# Patient Record
Sex: FEMALE | Race: WHITE | NOT HISPANIC OR LATINO
[De-identification: names, ages, dates, MRNs, and addresses within clinical notes are randomized per-mention and may not be internally consistent; named-entity substitution may affect disease eponyms.]

---

## 2020-01-07 ENCOUNTER — APPOINTMENT (OUTPATIENT)
Dept: OBGYN | Facility: CLINIC | Age: 25
End: 2020-01-07

## 2020-01-13 ENCOUNTER — RESULT REVIEW (OUTPATIENT)
Age: 25
End: 2020-01-13

## 2020-01-14 ENCOUNTER — OUTPATIENT (OUTPATIENT)
Dept: OUTPATIENT SERVICES | Facility: HOSPITAL | Age: 25
LOS: 1 days | End: 2020-01-14
Payer: MEDICAID

## 2020-01-14 ENCOUNTER — APPOINTMENT (OUTPATIENT)
Dept: OBGYN | Facility: CLINIC | Age: 25
End: 2020-01-14
Payer: MEDICAID

## 2020-01-14 VITALS
HEIGHT: 62 IN | SYSTOLIC BLOOD PRESSURE: 88 MMHG | BODY MASS INDEX: 29.65 KG/M2 | WEIGHT: 161.13 LBS | DIASTOLIC BLOOD PRESSURE: 51 MMHG | HEART RATE: 105 BPM | OXYGEN SATURATION: 98 % | TEMPERATURE: 98.3 F

## 2020-01-14 DIAGNOSIS — Z98.89 OTHER SPECIFIED POSTPROCEDURAL STATES: Chronic | ICD-10-CM

## 2020-01-14 DIAGNOSIS — Z00.00 ENCOUNTER FOR GENERAL ADULT MEDICAL EXAMINATION WITHOUT ABNORMAL FINDINGS: ICD-10-CM

## 2020-01-14 PROCEDURE — 99395 PREV VISIT EST AGE 18-39: CPT | Mod: NC,25

## 2020-01-14 PROCEDURE — 36415 COLL VENOUS BLD VENIPUNCTURE: CPT

## 2020-01-14 PROCEDURE — G0463: CPT

## 2020-01-14 PROCEDURE — 36415 COLL VENOUS BLD VENIPUNCTURE: CPT | Mod: NC

## 2020-01-14 NOTE — HISTORY OF PRESENT ILLNESS
[Definite:  ___ (Date)] : the last menstrual period was [unfilled] [Normal Amount/Duration] : was of a normal amount and duration [Menstrual Cramps] : menstrual cramps [Male ___] : [unfilled] male [Sexually Active] : is sexually active [Regular Cycle Intervals] : periods have been irregular

## 2020-01-15 DIAGNOSIS — Z01.419 ENCOUNTER FOR GYNECOLOGICAL EXAMINATION (GENERAL) (ROUTINE) WITHOUT ABNORMAL FINDINGS: ICD-10-CM

## 2020-01-15 LAB
C TRACH RRNA SPEC QL NAA+PROBE: SIGNIFICANT CHANGE UP
C TRACH+GC RRNA SPEC QL PROBE: SIGNIFICANT CHANGE UP
N GONORRHOEA RRNA SPEC QL NAA+PROBE: SIGNIFICANT CHANGE UP

## 2020-01-18 LAB — CYTOLOGY SPEC DOC CYTO: SIGNIFICANT CHANGE UP

## 2020-03-24 ENCOUNTER — APPOINTMENT (OUTPATIENT)
Dept: OBGYN | Facility: CLINIC | Age: 25
End: 2020-03-24
Payer: MEDICAID

## 2020-03-24 ENCOUNTER — OUTPATIENT (OUTPATIENT)
Dept: OUTPATIENT SERVICES | Facility: HOSPITAL | Age: 25
LOS: 1 days | End: 2020-03-24
Payer: MEDICAID

## 2020-03-24 VITALS
BODY MASS INDEX: 28.89 KG/M2 | TEMPERATURE: 97.7 F | HEIGHT: 62 IN | WEIGHT: 157 LBS | HEART RATE: 87 BPM | DIASTOLIC BLOOD PRESSURE: 75 MMHG | SYSTOLIC BLOOD PRESSURE: 111 MMHG | OXYGEN SATURATION: 99 %

## 2020-03-24 DIAGNOSIS — Z00.00 ENCOUNTER FOR GENERAL ADULT MEDICAL EXAMINATION WITHOUT ABNORMAL FINDINGS: ICD-10-CM

## 2020-03-24 DIAGNOSIS — Z98.89 OTHER SPECIFIED POSTPROCEDURAL STATES: Chronic | ICD-10-CM

## 2020-03-24 LAB
APPEARANCE UR: ABNORMAL
BACTERIA # UR AUTO: ABNORMAL
BILIRUB UR-MCNC: NEGATIVE — SIGNIFICANT CHANGE UP
COLOR SPEC: YELLOW — SIGNIFICANT CHANGE UP
DIFF PNL FLD: ABNORMAL
EPI CELLS # UR: 10 /HPF — HIGH (ref 0–5)
GLUCOSE UR QL: NEGATIVE — SIGNIFICANT CHANGE UP
HYALINE CASTS # UR AUTO: 0 /LPF — SIGNIFICANT CHANGE UP (ref 0–7)
KETONES UR-MCNC: NEGATIVE — SIGNIFICANT CHANGE UP
LEUKOCYTE ESTERASE UR-ACNC: ABNORMAL
NITRITE UR-MCNC: NEGATIVE — SIGNIFICANT CHANGE UP
PH UR: 6.5 — SIGNIFICANT CHANGE UP (ref 5–8)
PROT UR-MCNC: ABNORMAL
RBC CASTS # UR COMP ASSIST: 229 /HPF — HIGH (ref 0–4)
SP GR SPEC: 1.03 — HIGH (ref 1.01–1.02)
UROBILINOGEN FLD QL: SIGNIFICANT CHANGE UP
WBC UR QL: >720 /HPF — HIGH (ref 0–5)

## 2020-03-24 PROCEDURE — G0463: CPT

## 2020-03-24 PROCEDURE — 81025 URINE PREGNANCY TEST: CPT

## 2020-03-24 PROCEDURE — 87186 SC STD MICRODIL/AGAR DIL: CPT

## 2020-03-24 PROCEDURE — 87086 URINE CULTURE/COLONY COUNT: CPT

## 2020-03-24 PROCEDURE — 96372 THER/PROPH/DIAG INJ SC/IM: CPT

## 2020-03-24 PROCEDURE — 99213 OFFICE O/P EST LOW 20 MIN: CPT | Mod: NC,25

## 2020-03-24 PROCEDURE — 81001 URINALYSIS AUTO W/SCOPE: CPT

## 2020-03-24 NOTE — HISTORY OF PRESENT ILLNESS
[Last Pap ___] : Last cervical pap smear was [unfilled] [Definite:  ___ (Date)] : the last menstrual period was [unfilled] [Normal Amount/Duration] : was of a normal amount and duration [Menarche Age: ____] : age at menarche was [unfilled] [Sexually Active] : is sexually active [Male ___] : [unfilled] male [Spotting Between  Menses] : no spotting between menses [Regular Cycle Intervals] : periods have been irregular [Menstrual Cramps] : no menstrual cramps

## 2020-03-24 NOTE — REVIEW OF SYSTEMS
[Dysuria] : dysuria [Incontinence] : no incontinence [Frequency] : frequency [Urgency] : urgency [Urethral Discharge] : no abnormal urethral discharge [Nl] : Integumentary

## 2020-03-24 NOTE — CHIEF COMPLAINT
[Follow Up] : follow up GYN visit [FreeTextEntry1] : Patient complains of sensitivity in the vaginal area and frequency of urination.\par

## 2020-03-25 DIAGNOSIS — Z30.42 ENCOUNTER FOR SURVEILLANCE OF INJECTABLE CONTRACEPTIVE: ICD-10-CM

## 2020-03-25 DIAGNOSIS — R39.9 UNSPECIFIED SYMPTOMS AND SIGNS INVOLVING THE GENITOURINARY SYSTEM: ICD-10-CM

## 2020-06-15 ENCOUNTER — OUTPATIENT (OUTPATIENT)
Dept: OUTPATIENT SERVICES | Facility: HOSPITAL | Age: 25
LOS: 1 days | End: 2020-06-15
Payer: MEDICAID

## 2020-06-15 ENCOUNTER — APPOINTMENT (OUTPATIENT)
Dept: OBGYN | Facility: CLINIC | Age: 25
End: 2020-06-15
Payer: MEDICAID

## 2020-06-15 VITALS
RESPIRATION RATE: 18 BRPM | WEIGHT: 166 LBS | BODY MASS INDEX: 30.55 KG/M2 | DIASTOLIC BLOOD PRESSURE: 67 MMHG | HEIGHT: 62 IN | TEMPERATURE: 98.2 F | SYSTOLIC BLOOD PRESSURE: 101 MMHG | OXYGEN SATURATION: 100 %

## 2020-06-15 DIAGNOSIS — Z98.89 OTHER SPECIFIED POSTPROCEDURAL STATES: Chronic | ICD-10-CM

## 2020-06-15 DIAGNOSIS — Z00.00 ENCOUNTER FOR GENERAL ADULT MEDICAL EXAMINATION WITHOUT ABNORMAL FINDINGS: ICD-10-CM

## 2020-06-15 PROCEDURE — ZZZZZ: CPT

## 2020-06-15 PROCEDURE — 81025 URINE PREGNANCY TEST: CPT

## 2020-06-15 PROCEDURE — 96372 THER/PROPH/DIAG INJ SC/IM: CPT

## 2020-06-15 PROCEDURE — G0463: CPT

## 2020-06-16 DIAGNOSIS — Z30.42 ENCOUNTER FOR SURVEILLANCE OF INJECTABLE CONTRACEPTIVE: ICD-10-CM

## 2020-09-01 ENCOUNTER — OUTPATIENT (OUTPATIENT)
Dept: OUTPATIENT SERVICES | Facility: HOSPITAL | Age: 25
LOS: 1 days | End: 2020-09-01
Payer: MEDICAID

## 2020-09-01 ENCOUNTER — APPOINTMENT (OUTPATIENT)
Dept: OBGYN | Facility: CLINIC | Age: 25
End: 2020-09-01
Payer: MEDICAID

## 2020-09-01 DIAGNOSIS — Z00.00 ENCOUNTER FOR GENERAL ADULT MEDICAL EXAMINATION WITHOUT ABNORMAL FINDINGS: ICD-10-CM

## 2020-09-01 DIAGNOSIS — Z98.89 OTHER SPECIFIED POSTPROCEDURAL STATES: Chronic | ICD-10-CM

## 2020-09-01 PROCEDURE — 81025 URINE PREGNANCY TEST: CPT

## 2020-09-01 PROCEDURE — ZZZZZ: CPT

## 2020-09-01 PROCEDURE — G0463: CPT

## 2020-09-01 PROCEDURE — 96372 THER/PROPH/DIAG INJ SC/IM: CPT

## 2020-09-02 DIAGNOSIS — Z30.42 ENCOUNTER FOR SURVEILLANCE OF INJECTABLE CONTRACEPTIVE: ICD-10-CM

## 2020-11-24 ENCOUNTER — APPOINTMENT (OUTPATIENT)
Dept: OBGYN | Facility: CLINIC | Age: 25
End: 2020-11-24
Payer: MEDICAID

## 2020-11-24 ENCOUNTER — OUTPATIENT (OUTPATIENT)
Dept: OUTPATIENT SERVICES | Facility: HOSPITAL | Age: 25
LOS: 1 days | End: 2020-11-24
Payer: MEDICAID

## 2020-11-24 VITALS
OXYGEN SATURATION: 99 % | RESPIRATION RATE: 16 BRPM | TEMPERATURE: 98.4 F | WEIGHT: 166 LBS | SYSTOLIC BLOOD PRESSURE: 90 MMHG | HEIGHT: 62 IN | BODY MASS INDEX: 30.55 KG/M2 | HEART RATE: 71 BPM | DIASTOLIC BLOOD PRESSURE: 64 MMHG

## 2020-11-24 DIAGNOSIS — Z98.89 OTHER SPECIFIED POSTPROCEDURAL STATES: Chronic | ICD-10-CM

## 2020-11-24 DIAGNOSIS — Z00.00 ENCOUNTER FOR GENERAL ADULT MEDICAL EXAMINATION WITHOUT ABNORMAL FINDINGS: ICD-10-CM

## 2020-11-24 PROCEDURE — G0463: CPT

## 2020-11-24 PROCEDURE — 81025 URINE PREGNANCY TEST: CPT

## 2020-11-24 PROCEDURE — 96372 THER/PROPH/DIAG INJ SC/IM: CPT

## 2020-11-24 PROCEDURE — ZZZZZ: CPT

## 2020-12-01 DIAGNOSIS — Z30.42 ENCOUNTER FOR SURVEILLANCE OF INJECTABLE CONTRACEPTIVE: ICD-10-CM

## 2020-12-11 ENCOUNTER — EMERGENCY (EMERGENCY)
Facility: HOSPITAL | Age: 25
LOS: 1 days | Discharge: HOME | End: 2020-12-11
Attending: EMERGENCY MEDICINE
Payer: MEDICAID

## 2020-12-11 VITALS
RESPIRATION RATE: 20 BRPM | HEIGHT: 62 IN | HEART RATE: 102 BPM | DIASTOLIC BLOOD PRESSURE: 63 MMHG | OXYGEN SATURATION: 99 % | SYSTOLIC BLOOD PRESSURE: 97 MMHG | TEMPERATURE: 98 F

## 2020-12-11 VITALS
HEART RATE: 98 BPM | RESPIRATION RATE: 18 BRPM | TEMPERATURE: 98 F | SYSTOLIC BLOOD PRESSURE: 98 MMHG | OXYGEN SATURATION: 97 % | DIASTOLIC BLOOD PRESSURE: 67 MMHG

## 2020-12-11 DIAGNOSIS — Z98.89 OTHER SPECIFIED POSTPROCEDURAL STATES: Chronic | ICD-10-CM

## 2020-12-11 PROCEDURE — 99283 EMERGENCY DEPT VISIT LOW MDM: CPT

## 2020-12-11 NOTE — ED PROVIDER NOTE - ATTENDING CONTRIBUTION TO CARE
Agree with medical student H&P.  25y Female with history of penicillin allergy here for swelling of the face and hives. Pt had a candy bar this morning with almonds and noticed the swelling and itchiness soon after.  Pt went to an Urgent care given prednisone and pepcid.  In ED, pt denies lip or tongue swelling.  pt tolerating oral secretions, no change in voice, normal oxygen saturation. Pt reporting feeling better.  Will continue to observe, will hold Epi for now.

## 2020-12-11 NOTE — ED PROVIDER NOTE - OBJECTIVE STATEMENT
25y F with penicillin allergy here for swelling of the face and hives on arms. Pt had a kind bar this morning with almonds at 0700 and had facial swelling, redness and itchiness at 0720. She took benadryl 50mg at 720am and then went to urgent care. Urgent care gave prednisone 60mg and famotidine 20mg and sent her to ED. Pt is speaking in full sentences. Denies SOB. Denies tongue and throat swelling. Denies nausea/vomiting or diarrhea.

## 2020-12-11 NOTE — ED ADULT TRIAGE NOTE - CHIEF COMPLAINT QUOTE
c/o allergic reaction. Reports eating a kind bar at 0700 and had facial swelling and redness at 0720. Took benadryl 50mg at 7am. Send from urgent care for eval where pt received prednisone 60mg and famotidine 20mg. Speaking in full sentences

## 2020-12-11 NOTE — ED PROVIDER NOTE - CLINICAL SUMMARY MEDICAL DECISION MAKING FREE TEXT BOX
25y F here for allergic rxn. Pt appears to have had an allegic rxn to the kind bar. She is improving on benadryl, famotidine and prednisone. Will continue to monitor.

## 2020-12-11 NOTE — ED PROVIDER NOTE - PATIENT PORTAL LINK FT
You can access the FollowMyHealth Patient Portal offered by [facility] by registering at the following website: [email or web address]. By joining [employer]’s FollowMyHealth portal, you will also be able to view your health information using other applications (apps) compatible with our system.

## 2020-12-11 NOTE — ED PROVIDER NOTE - NSFOLLOWUPINSTRUCTIONS_ED_ALL_ED_FT
Allergies    WHAT YOU NEED TO KNOW:    What are allergies? Allergies are an immune system reaction to a substance called an allergen. Your immune system sees the allergen as harmful and attacks it.     What causes allergies? You may have allergies at certain times of the year or all year. The following are common allergies:   •Seasonal airborne allergies happen during certain times of the year. This is also called hay fever. Tree, weed, or grass pollen are examples of allergens that you breathe in.      •Environmental airborne allergy triggers you may breathe in year-round include dust, mold, and pet hair.       •Contact allergies include latex, found in items such as condoms and medical gloves. Latex allergies can be very serious.       •Insect sting allergies may be caused by bees, hornets, fire ants, or other insects that sting or bite you. Insect allergies can be very serious.       •Food allergies commonly include shellfish, wheat, and eggs. Some foods must be eaten to produce an allergic reaction. Other foods can trigger a reaction if they touch your skin or are breathed in.      What increases my risk for allergies? Allergic reactions can happen at any time, even if you have not had allergies before. You may develop an allergy after you have been exposed to an allergen more than once. Allergies are most common in children and elderly people, but anyone can have an allergic reaction. Your risk is also increased if you have a family history of allergies or a medical condition such as asthma.    What are the signs and symptoms of allergies?   •Mild symptoms include sneezing and a runny, itchy, or stuffy nose. You may also have swollen, watery, or itchy eyes, or skin itching. You may have swelling or pain where an insect bit or stung you.      •Anaphylaxis symptoms include trouble breathing or swallowing, a rash or hives, or severe swelling. You may also have a cough, wheezing, or feel lightheaded or dizzy. Anaphylaxis is a sudden, life-threatening reaction that needs immediate treatment.      How are allergies diagnosed? Your healthcare provider will ask about your signs and symptoms. He or she will ask what allergens you have been exposed to and if you have ever had other allergic reactions. He or she may look in your nose, ears, or throat. You may need additional testing if you developed anaphylaxis after you were exposed to a trigger and then exercised. This is called exercise-induced anaphylaxis. You may also need the following tests:   •Blood tests are used to check for signs of a reaction to allergens.       •Nasal tests are used to see how your nasal passages react to allergens. A sample of your nasal fluid may also be tested.      •Skin tests can help your healthcare provider find what you are allergic to. He will place a small amount of allergen on your arm or back and then prick your skin with a needle. He will watch how your skin reacts to the allergen.       How are allergies treated?   •Antihistamines help decrease itching, sneezing, and swelling. You may take them as a pill or use drops in your nose or eyes.       •Decongestants help your nose feel less stuffy.       •Steroids decrease swelling and redness.       •Topical treatments help decrease itching or swelling. You also may be given nasal sprays or eyedrops.       •Epinephrine is medicine used to treat severe allergic reactions such as anaphylaxis.      •Desensitization gets your body used to allergens you cannot avoid. Your healthcare provider will give you a shot that contains a small amount of an allergen. He or she will treat any allergic reaction you have. Your provider will give you more of the allergen a little at a time until your body gets used to it. Your reaction to the allergen may be less serious after this treatment. Your healthcare provider will tell you how long to get the shots.       What steps do I need to take for signs or symptoms of anaphylaxis?   •Immediately give 1 shot of epinephrine only into the outer thigh muscle.       •Leave the shot in place as directed. Your healthcare provider may recommend you leave it in place for up to 10 seconds before you remove it. This helps make sure all of the epinephrine is delivered.       •Call 911 and go to the emergency department, even if the shot improved symptoms. Do not drive yourself. Bring the used epinephrine shot with you.       What safety precautions do I need to take if I am at risk for anaphylaxis?   •Keep 2 shots of epinephrine with you at all times. You may need a second shot, because epinephrine only works for about 20 minutes and symptoms may return. Your healthcare provider can show you and family members how to give the shot. Check the expiration date every month and replace it before it expires.      •Create an action plan. Your healthcare provider can help you create a written plan that explains the allergy and an emergency plan to treat a reaction. The plan explains when to give a second epinephrine shot if symptoms return or do not improve after the first. Give copies of the action plan and emergency instructions to family members and work staff. Show them how to give a shot of epinephrine.      •Be careful when you exercise. If you have had exercise-induced anaphylaxis, do not exercise right after you eat. Stop exercising right away if you start to develop any signs or symptoms of anaphylaxis. You may first feel tired, warm, or have itchy skin. Hives, swelling, and severe breathing problems may develop if you continue to exercise.      •Carry medical alert identification. Wear medical alert jewelry or carry a card that explains the allergy. Ask your healthcare provider where to get these items.   Medical Alert Jewelry           •Inform all healthcare providers of the allergy. This includes dentists, nurses, doctors, and surgeons.       How can I manage allergies?   •Use nasal rinses as directed. Rinse with a saline solution daily. This will help clear allergens out of your nose. Use distilled water if possible. You can also boil tap water and let it cool before you use it. Do not use tap water that has not been boiled.      •Do not smoke. Allergy symptoms may decrease if you are not around smoke. Nicotine and other chemicals in cigarettes and cigars can cause lung damage. Ask your healthcare provider for information if you currently smoke and need help to quit. E-cigarettes or smokeless tobacco still contain nicotine. Talk to your healthcare provider before you use these products.       How can I prevent an allergic reaction?   •Do not go outside when pollen counts are high if you have seasonal allergies. Your symptoms may be better if you go outside only in the morning or evening. Use your air conditioner, and change air filters often.       •Avoid dust, fur, and mold. Dust and vacuum your home often. You may want to wear a mask when you vacuum. Keep pets in certain rooms, and bathe them often. Use a dehumidifier (machine that decreases moisture) to help prevent mold.       •Do not use products that contain latex if you have a latex allergy. Use nonlatex gloves if you work in healthcare or in food preparation. Always tell healthcare providers about a latex allergy.       •Avoid areas that attract insects if you have an insect bite or sting allergy. Areas include trash cans, gardens, and picnics. Do not wear bright clothing or strong scents when you will be outside.      •Prevent an allergic reaction caused by food. You may have a reaction if your food is not prepared safely. For example, you could be served food that touched your trigger food during preparation. This is called cross-contamination. Kitchen tools can also cause cross-contamination. You may also eat baked foods that contain a trigger food you do not know about. Ask if the food contains your trigger food before you handle or eat it.      Call 911 for signs or symptoms of anaphylaxis, such as trouble breathing, swelling in your mouth or throat, or wheezing. You may also have itching, a rash, hives, or feel like you are going to faint.    When should I seek immediate care?   •You have tingling in your hands or feet.       •Your skin is red or flushed.       When should I contact my healthcare provider?   •You have questions or concerns about your condition or care.          CARE AGREEMENT:    You have the right to help plan your care. Learn about your health condition and how it may be treated. Discuss treatment options with your healthcare providers to decide what care you want to receive. You always have the right to refuse treatment.        © Copyright [employer] 2020           back to top                          © Copyright [employer] 2020

## 2020-12-11 NOTE — ED ADULT NURSE NOTE - OBJECTIVE STATEMENT
Pt states ate a chocolate bar this morning at 0700, then at 0720 started having facial swelling and itching, took Benadryl at 0730, then Prednisone and Famotidine from Urgent Care  On arrival to ED verbalizes feeling better, denies difficulty breathing or swallowing, speaks in full sentences

## 2021-02-19 ENCOUNTER — APPOINTMENT (OUTPATIENT)
Dept: OBGYN | Facility: CLINIC | Age: 26
End: 2021-02-19
Payer: MEDICAID

## 2021-02-19 ENCOUNTER — OUTPATIENT (OUTPATIENT)
Dept: OUTPATIENT SERVICES | Facility: HOSPITAL | Age: 26
LOS: 1 days | End: 2021-02-19
Payer: MEDICAID

## 2021-02-19 VITALS
HEART RATE: 75 BPM | RESPIRATION RATE: 18 BRPM | SYSTOLIC BLOOD PRESSURE: 105 MMHG | TEMPERATURE: 98 F | DIASTOLIC BLOOD PRESSURE: 69 MMHG | OXYGEN SATURATION: 95 %

## 2021-02-19 DIAGNOSIS — Z98.89 OTHER SPECIFIED POSTPROCEDURAL STATES: Chronic | ICD-10-CM

## 2021-02-19 DIAGNOSIS — Z00.00 ENCOUNTER FOR GENERAL ADULT MEDICAL EXAMINATION WITHOUT ABNORMAL FINDINGS: ICD-10-CM

## 2021-02-19 PROCEDURE — 96372 THER/PROPH/DIAG INJ SC/IM: CPT

## 2021-02-19 PROCEDURE — 81025 URINE PREGNANCY TEST: CPT

## 2021-02-19 PROCEDURE — G0463: CPT

## 2021-02-19 PROCEDURE — ZZZZZ: CPT

## 2021-02-23 DIAGNOSIS — Z30.42 ENCOUNTER FOR SURVEILLANCE OF INJECTABLE CONTRACEPTIVE: ICD-10-CM

## 2021-05-21 ENCOUNTER — OUTPATIENT (OUTPATIENT)
Dept: OUTPATIENT SERVICES | Facility: HOSPITAL | Age: 26
LOS: 1 days | End: 2021-05-21
Payer: MEDICAID

## 2021-05-21 ENCOUNTER — APPOINTMENT (OUTPATIENT)
Dept: OBGYN | Facility: CLINIC | Age: 26
End: 2021-05-21
Payer: MEDICAID

## 2021-05-21 DIAGNOSIS — Z98.89 OTHER SPECIFIED POSTPROCEDURAL STATES: Chronic | ICD-10-CM

## 2021-05-21 DIAGNOSIS — Z00.00 ENCOUNTER FOR GENERAL ADULT MEDICAL EXAMINATION WITHOUT ABNORMAL FINDINGS: ICD-10-CM

## 2021-05-21 PROCEDURE — 81025 URINE PREGNANCY TEST: CPT

## 2021-05-21 PROCEDURE — G0463: CPT

## 2021-05-21 PROCEDURE — ZZZZZ: CPT

## 2021-05-21 PROCEDURE — 96372 THER/PROPH/DIAG INJ SC/IM: CPT

## 2021-05-24 DIAGNOSIS — Z30.42 ENCOUNTER FOR SURVEILLANCE OF INJECTABLE CONTRACEPTIVE: ICD-10-CM

## 2021-07-13 ENCOUNTER — APPOINTMENT (OUTPATIENT)
Dept: OBGYN | Facility: CLINIC | Age: 26
End: 2021-07-13
Payer: MEDICAID

## 2021-07-13 ENCOUNTER — OUTPATIENT (OUTPATIENT)
Dept: OUTPATIENT SERVICES | Facility: HOSPITAL | Age: 26
LOS: 1 days | End: 2021-07-13
Payer: MEDICAID

## 2021-07-13 VITALS
OXYGEN SATURATION: 98 % | SYSTOLIC BLOOD PRESSURE: 104 MMHG | DIASTOLIC BLOOD PRESSURE: 64 MMHG | TEMPERATURE: 98.6 F | WEIGHT: 167 LBS | HEIGHT: 62 IN | BODY MASS INDEX: 30.73 KG/M2 | HEART RATE: 57 BPM

## 2021-07-13 DIAGNOSIS — Z30.42 ENCOUNTER FOR SURVEILLANCE OF INJECTABLE CONTRACEPTIVE: ICD-10-CM

## 2021-07-13 DIAGNOSIS — R39.9 UNSPECIFIED SYMPTOMS AND SIGNS INVOLVING THE GENITOURINARY SYSTEM: ICD-10-CM

## 2021-07-13 DIAGNOSIS — Z30.011 ENCOUNTER FOR INITIAL PRESCRIPTION OF CONTRACEPTIVE PILLS: ICD-10-CM

## 2021-07-13 DIAGNOSIS — Z98.89 OTHER SPECIFIED POSTPROCEDURAL STATES: Chronic | ICD-10-CM

## 2021-07-13 DIAGNOSIS — Z00.00 ENCOUNTER FOR GENERAL ADULT MEDICAL EXAMINATION WITHOUT ABNORMAL FINDINGS: ICD-10-CM

## 2021-07-13 PROCEDURE — 85025 COMPLETE CBC W/AUTO DIFF WBC: CPT

## 2021-07-13 PROCEDURE — 36415 COLL VENOUS BLD VENIPUNCTURE: CPT | Mod: NC

## 2021-07-13 PROCEDURE — 84443 ASSAY THYROID STIM HORMONE: CPT

## 2021-07-13 PROCEDURE — 80053 COMPREHEN METABOLIC PANEL: CPT

## 2021-07-13 PROCEDURE — 83036 HEMOGLOBIN GLYCOSYLATED A1C: CPT

## 2021-07-13 PROCEDURE — 82306 VITAMIN D 25 HYDROXY: CPT

## 2021-07-13 PROCEDURE — 99213 OFFICE O/P EST LOW 20 MIN: CPT

## 2021-07-13 PROCEDURE — G0463: CPT

## 2021-07-13 PROCEDURE — 84439 ASSAY OF FREE THYROXINE: CPT

## 2021-07-13 PROCEDURE — 36415 COLL VENOUS BLD VENIPUNCTURE: CPT

## 2021-07-13 NOTE — HISTORY OF PRESENT ILLNESS
[Patient reported PAP Smear was normal] : Patient reported PAP Smear was normal [FreeTextEntry1] : Patient has been administering Depo x 5 years, last administered (05/21/21) requesting alternate options today due to significant change and reduction in libido, acute hair loss x 1 week and excessive weight gain despite attempts at weight loss through diet/exercise.\par \par Patient's last Depo was on (05/21/21) advised to stop Depo x 6 months, and hormone free for 6 months to see if patient notices any improvements.  Counseled on non-hormonal IUD, currently declines.   [PapSmeardate] : 1/14/2020 [TextBox_31] : WNL

## 2021-07-14 DIAGNOSIS — R68.82 DECREASED LIBIDO: ICD-10-CM

## 2021-07-14 DIAGNOSIS — Z30.9 ENCOUNTER FOR CONTRACEPTIVE MANAGEMENT, UNSPECIFIED: ICD-10-CM

## 2021-07-14 DIAGNOSIS — L65.0 TELOGEN EFFLUVIUM: ICD-10-CM

## 2021-07-14 DIAGNOSIS — R63.5 ABNORMAL WEIGHT GAIN: ICD-10-CM

## 2021-07-14 LAB
25(OH)D3 SERPL-MCNC: 21.6 NG/ML
ALBUMIN SERPL ELPH-MCNC: 4.6 G/DL
ALP BLD-CCNC: 106 U/L
ALT SERPL-CCNC: 14 U/L
ANION GAP SERPL CALC-SCNC: 17 MMOL/L
AST SERPL-CCNC: 13 U/L
BASOPHILS # BLD AUTO: 0.07 K/UL
BASOPHILS NFR BLD AUTO: 0.8 %
BILIRUB SERPL-MCNC: <0.2 MG/DL
BUN SERPL-MCNC: 12 MG/DL
CALCIUM SERPL-MCNC: 9.7 MG/DL
CHLORIDE SERPL-SCNC: 105 MMOL/L
CO2 SERPL-SCNC: 20 MMOL/L
CREAT SERPL-MCNC: 0.8 MG/DL
EOSINOPHIL # BLD AUTO: 0.24 K/UL
EOSINOPHIL NFR BLD AUTO: 2.8 %
ESTIMATED AVERAGE GLUCOSE: 105 MG/DL
GLUCOSE SERPL-MCNC: 93 MG/DL
HBA1C MFR BLD HPLC: 5.3 %
HCT VFR BLD CALC: 38.6 %
HGB BLD-MCNC: 12.2 G/DL
IMM GRANULOCYTES NFR BLD AUTO: 0.2 %
LYMPHOCYTES # BLD AUTO: 2.27 K/UL
LYMPHOCYTES NFR BLD AUTO: 26.8 %
MAN DIFF?: NORMAL
MCHC RBC-ENTMCNC: 27.8 PG
MCHC RBC-ENTMCNC: 31.6 GM/DL
MCV RBC AUTO: 87.9 FL
MONOCYTES # BLD AUTO: 0.5 K/UL
MONOCYTES NFR BLD AUTO: 5.9 %
NEUTROPHILS # BLD AUTO: 5.38 K/UL
NEUTROPHILS NFR BLD AUTO: 63.5 %
PLATELET # BLD AUTO: 260 K/UL
POTASSIUM SERPL-SCNC: 4.4 MMOL/L
PROT SERPL-MCNC: 7.1 G/DL
RBC # BLD: 4.39 M/UL
RBC # FLD: 13.2 %
SODIUM SERPL-SCNC: 142 MMOL/L
T4 FREE SERPL-MCNC: 1.2 NG/DL
TSH SERPL-ACNC: 0.81 UIU/ML
WBC # FLD AUTO: 8.48 K/UL

## 2021-07-14 RX ORDER — NITROFURANTOIN MACROCRYSTALS 100 MG/1
100 CAPSULE ORAL
Qty: 14 | Refills: 0 | Status: COMPLETED | COMMUNITY
Start: 2020-03-24 | End: 2021-07-14

## 2021-07-14 RX ORDER — CRANBERRY CONC/C/BACILL COAG 250-30-15
250-30 TABLET ORAL TWICE DAILY
Qty: 6 | Refills: 0 | Status: COMPLETED | COMMUNITY
Start: 2020-03-24 | End: 2021-07-14

## 2021-12-15 DIAGNOSIS — R68.82 DECREASED LIBIDO: ICD-10-CM

## 2021-12-15 DIAGNOSIS — L65.0 TELOGEN EFFLUVIUM: ICD-10-CM

## 2021-12-15 DIAGNOSIS — R63.5 ABNORMAL WEIGHT GAIN: ICD-10-CM

## 2021-12-15 DIAGNOSIS — Z30.9 ENCOUNTER FOR CONTRACEPTIVE MANAGEMENT, UNSPECIFIED: ICD-10-CM

## 2021-12-15 RX ORDER — MEDROXYPROGESTERONE ACETATE 150 MG/ML
150 INJECTION, SUSPENSION INTRAMUSCULAR
Qty: 1 | Refills: 3 | Status: COMPLETED | COMMUNITY
Start: 2020-06-12 | End: 2021-12-15

## 2022-01-03 ENCOUNTER — TRANSCRIPTION ENCOUNTER (OUTPATIENT)
Age: 27
End: 2022-01-03

## 2022-01-03 ENCOUNTER — APPOINTMENT (OUTPATIENT)
Dept: OBGYN | Facility: CLINIC | Age: 27
End: 2022-01-03
Payer: MEDICAID

## 2022-01-03 ENCOUNTER — OUTPATIENT (OUTPATIENT)
Dept: OUTPATIENT SERVICES | Facility: HOSPITAL | Age: 27
LOS: 1 days | End: 2022-01-03
Payer: MEDICAID

## 2022-01-03 VITALS
BODY MASS INDEX: 31.28 KG/M2 | HEART RATE: 80 BPM | DIASTOLIC BLOOD PRESSURE: 70 MMHG | TEMPERATURE: 97.4 F | RESPIRATION RATE: 16 BRPM | OXYGEN SATURATION: 100 % | SYSTOLIC BLOOD PRESSURE: 110 MMHG | WEIGHT: 170 LBS | HEIGHT: 62 IN

## 2022-01-03 DIAGNOSIS — Z34.00 ENCOUNTER FOR SUPERVISION OF NORMAL FIRST PREGNANCY, UNSPECIFIED TRIMESTER: ICD-10-CM

## 2022-01-03 DIAGNOSIS — Z98.89 OTHER SPECIFIED POSTPROCEDURAL STATES: Chronic | ICD-10-CM

## 2022-01-03 PROCEDURE — 81003 URINALYSIS AUTO W/O SCOPE: CPT

## 2022-01-03 PROCEDURE — 86803 HEPATITIS C AB TEST: CPT

## 2022-01-03 PROCEDURE — 36415 COLL VENOUS BLD VENIPUNCTURE: CPT | Mod: NC

## 2022-01-03 PROCEDURE — 87389 HIV-1 AG W/HIV-1&-2 AB AG IA: CPT

## 2022-01-03 PROCEDURE — G0463: CPT

## 2022-01-03 PROCEDURE — 36415 COLL VENOUS BLD VENIPUNCTURE: CPT

## 2022-01-03 PROCEDURE — 81220 CFTR GENE COM VARIANTS: CPT

## 2022-01-03 PROCEDURE — 99214 OFFICE O/P EST MOD 30 MIN: CPT | Mod: 25

## 2022-01-03 PROCEDURE — 85025 COMPLETE CBC W/AUTO DIFF WBC: CPT

## 2022-01-03 PROCEDURE — 83020 HEMOGLOBIN ELECTROPHORESIS: CPT

## 2022-01-03 PROCEDURE — 86787 VARICELLA-ZOSTER ANTIBODY: CPT

## 2022-01-03 PROCEDURE — 87086 URINE CULTURE/COLONY COUNT: CPT

## 2022-01-03 PROCEDURE — 86765 RUBEOLA ANTIBODY: CPT

## 2022-01-03 PROCEDURE — 81329 SMN1 GENE DOS/DELETION ALYS: CPT

## 2022-01-03 PROCEDURE — 83036 HEMOGLOBIN GLYCOSYLATED A1C: CPT

## 2022-01-03 PROCEDURE — 84439 ASSAY OF FREE THYROXINE: CPT

## 2022-01-03 PROCEDURE — 86762 RUBELLA ANTIBODY: CPT

## 2022-01-03 PROCEDURE — 86901 BLOOD TYPING SEROLOGIC RH(D): CPT

## 2022-01-03 PROCEDURE — 81243 FMR1 GEN ALY DETC ABNL ALLEL: CPT

## 2022-01-03 PROCEDURE — 81025 URINE PREGNANCY TEST: CPT

## 2022-01-03 PROCEDURE — 86850 RBC ANTIBODY SCREEN: CPT

## 2022-01-03 PROCEDURE — 86480 TB TEST CELL IMMUN MEASURE: CPT

## 2022-01-03 PROCEDURE — 83655 ASSAY OF LEAD: CPT

## 2022-01-03 PROCEDURE — 87340 HEPATITIS B SURFACE AG IA: CPT

## 2022-01-03 PROCEDURE — 86900 BLOOD TYPING SEROLOGIC ABO: CPT

## 2022-01-03 PROCEDURE — 86780 TREPONEMA PALLIDUM: CPT

## 2022-01-03 PROCEDURE — 84443 ASSAY THYROID STIM HORMONE: CPT

## 2022-01-04 DIAGNOSIS — Z3A.08 8 WEEKS GESTATION OF PREGNANCY: ICD-10-CM

## 2022-01-04 DIAGNOSIS — Z11.3 ENCOUNTER FOR SCREENING FOR INFECTIONS WITH A PREDOMINANTLY SEXUAL MODE OF TRANSMISSION: ICD-10-CM

## 2022-01-04 DIAGNOSIS — Z12.39 ENCOUNTER FOR OTHER SCREENING FOR MALIGNANT NEOPLASM OF BREAST: ICD-10-CM

## 2022-01-04 DIAGNOSIS — Z34.91 ENCOUNTER FOR SUPERVISION OF NORMAL PREGNANCY, UNSPECIFIED, FIRST TRIMESTER: ICD-10-CM

## 2022-01-04 LAB
ABO + RH PNL BLD: NORMAL
BASOPHILS # BLD AUTO: 0.07 K/UL
BASOPHILS NFR BLD AUTO: 0.7 %
BLD GP AB SCN SERPL QL: NORMAL
C TRACH RRNA SPEC QL NAA+PROBE: NOT DETECTED
EOSINOPHIL # BLD AUTO: 0.47 K/UL
EOSINOPHIL NFR BLD AUTO: 4.9 %
ESTIMATED AVERAGE GLUCOSE: 103 MG/DL
HBA1C MFR BLD HPLC: 5.2 %
HBV SURFACE AG SER QL: NONREACTIVE
HCT VFR BLD CALC: 36.1 %
HCV AB SER QL: NONREACTIVE
HCV S/CO RATIO: 0.08 S/CO
HGB A MFR BLD: 97.4 %
HGB A2 MFR BLD: 2.6 %
HGB BLD-MCNC: 11.7 G/DL
HGB FRACT BLD-IMP: NORMAL
HIV1+2 AB SPEC QL IA.RAPID: NONREACTIVE
IMM GRANULOCYTES NFR BLD AUTO: 0.3 %
LYMPHOCYTES # BLD AUTO: 2.28 K/UL
LYMPHOCYTES NFR BLD AUTO: 24 %
MAN DIFF?: NORMAL
MCHC RBC-ENTMCNC: 27.4 PG
MCHC RBC-ENTMCNC: 32.4 GM/DL
MCV RBC AUTO: 84.5 FL
MONOCYTES # BLD AUTO: 0.61 K/UL
MONOCYTES NFR BLD AUTO: 6.4 %
N GONORRHOEA RRNA SPEC QL NAA+PROBE: NOT DETECTED
NEUTROPHILS # BLD AUTO: 6.04 K/UL
NEUTROPHILS NFR BLD AUTO: 63.7 %
PLATELET # BLD AUTO: 284 K/UL
RBC # BLD: 4.27 M/UL
RBC # FLD: 13.6 %
RUBV IGG FLD-ACNC: 3.2 INDEX
RUBV IGG SER-IMP: POSITIVE
SOURCE AMPLIFICATION: NORMAL
T PALLIDUM AB SER QL IA: NEGATIVE
T4 FREE SERPL-MCNC: 1.5 NG/DL
TSH SERPL-ACNC: 0.04 UIU/ML
VZV AB TITR SER: NEGATIVE
VZV IGG SER IF-ACNC: 37.8 INDEX
WBC # FLD AUTO: 9.5 K/UL

## 2022-01-05 LAB
BACTERIA UR CULT: NORMAL
LEAD BLD-MCNC: <1 UG/DL
MEV IGG FLD QL IA: 17.3 AU/ML
MEV IGG+IGM SER-IMP: POSITIVE

## 2022-01-06 LAB
M TB IFN-G BLD-IMP: NEGATIVE
QUANTIFERON TB PLUS MITOGEN MINUS NIL: 6.31 IU/ML
QUANTIFERON TB PLUS NIL: 0.01 IU/ML
QUANTIFERON TB PLUS TB1 MINUS NIL: 0 IU/ML
QUANTIFERON TB PLUS TB2 MINUS NIL: 0 IU/ML

## 2022-01-07 LAB — AR GENE MUT ANL BLD/T: NORMAL

## 2022-01-11 LAB — FMR1 GENE MUT ANL BLD/T: NORMAL

## 2022-01-15 LAB — CFTR MUT TESTED BLD/T: NEGATIVE

## 2022-01-28 ENCOUNTER — ASOB RESULT (OUTPATIENT)
Age: 27
End: 2022-01-28

## 2022-01-28 ENCOUNTER — APPOINTMENT (OUTPATIENT)
Dept: ANTEPARTUM | Facility: CLINIC | Age: 27
End: 2022-01-28
Payer: MEDICAID

## 2022-01-28 PROCEDURE — 76801 OB US < 14 WKS SINGLE FETUS: CPT

## 2022-01-28 PROCEDURE — 76813 OB US NUCHAL MEAS 1 GEST: CPT | Mod: 59

## 2022-01-31 ENCOUNTER — APPOINTMENT (OUTPATIENT)
Dept: OBGYN | Facility: CLINIC | Age: 27
End: 2022-01-31
Payer: MEDICAID

## 2022-01-31 ENCOUNTER — NON-APPOINTMENT (OUTPATIENT)
Age: 27
End: 2022-01-31

## 2022-01-31 ENCOUNTER — OUTPATIENT (OUTPATIENT)
Dept: OUTPATIENT SERVICES | Facility: HOSPITAL | Age: 27
LOS: 1 days | End: 2022-01-31
Payer: MEDICAID

## 2022-01-31 VITALS
DIASTOLIC BLOOD PRESSURE: 65 MMHG | OXYGEN SATURATION: 98 % | TEMPERATURE: 97.3 F | SYSTOLIC BLOOD PRESSURE: 106 MMHG | BODY MASS INDEX: 30.36 KG/M2 | HEART RATE: 81 BPM | WEIGHT: 166 LBS

## 2022-01-31 DIAGNOSIS — Z34.90 ENCOUNTER FOR SUPERVISION OF NORMAL PREGNANCY, UNSPECIFIED, UNSPECIFIED TRIMESTER: ICD-10-CM

## 2022-01-31 DIAGNOSIS — Z12.39 ENCOUNTER FOR OTHER SCREENING FOR MALIGNANT NEOPLASM OF BREAST: ICD-10-CM

## 2022-01-31 DIAGNOSIS — Z34.00 ENCOUNTER FOR SUPERVISION OF NORMAL FIRST PREGNANCY, UNSPECIFIED TRIMESTER: ICD-10-CM

## 2022-01-31 DIAGNOSIS — Z11.3 ENCOUNTER FOR SCREENING FOR INFECTIONS WITH A PREDOMINANTLY SEXUAL MODE OF TRANSMISSION: ICD-10-CM

## 2022-01-31 DIAGNOSIS — Z98.89 OTHER SPECIFIED POSTPROCEDURAL STATES: Chronic | ICD-10-CM

## 2022-01-31 PROCEDURE — 99214 OFFICE O/P EST MOD 30 MIN: CPT

## 2022-01-31 PROCEDURE — 81003 URINALYSIS AUTO W/O SCOPE: CPT

## 2022-01-31 PROCEDURE — G0463: CPT

## 2022-02-02 ENCOUNTER — APPOINTMENT (OUTPATIENT)
Dept: UROLOGY | Facility: CLINIC | Age: 27
End: 2022-02-02
Payer: MEDICAID

## 2022-02-02 PROBLEM — Z12.39 SCREENING BREAST EXAMINATION: Status: RESOLVED | Noted: 2022-01-03 | Resolved: 2022-02-02

## 2022-02-02 PROBLEM — Z11.3 SCREENING FOR STDS (SEXUALLY TRANSMITTED DISEASES): Status: RESOLVED | Noted: 2022-01-03 | Resolved: 2022-02-02

## 2022-02-02 PROCEDURE — 99204 OFFICE O/P NEW MOD 45 MIN: CPT

## 2022-02-02 NOTE — HISTORY OF PRESENT ILLNESS
[FreeTextEntry1] : Lamar is a healthy 26 year old female at 12 weeks gestation presenting for initial visit for microscopic hematuria on dipstick. OBGYN Dr. Lopez recommended urology follow up since urine dipstick has been positive for blood at last two visits. States she always had microscopic hematuria since she was young, has not had workup. Denies gross hematuria, tobacco use. Denies history of kidney stones or  surgery. Son was born 5 years ago by vaginal delivery. Denies flank pain, fevers. States mother has chronic microscopic hematuria, possible history of stones. She has had multiple UTIs. First in 2015, was hospitalized during first pregnancy at [facility], was discharged and then re-admitted to [facility] with pyelonephritis requiring IV ABx. Second UTI in summer of 2016 after giving birth and was sent home with oral antibiotics. Currently has concern for hemorrhoids, saw [facility], Gyn sending stool softeners. Otherwise feels tired but well overall. \par \par Allergies to penicillin\par  \par

## 2022-02-02 NOTE — ASSESSMENT
[FreeTextEntry1] : 26 year old healthy female at 12 weeks gestation with multiple positive dipsticks for blood in urine, asymptomatic. \par \par -- Reviewed etiology of microscopic hematuria including UTI, nephrolithiasis, strictures and malignancy \par -- UA\par -- UCx\par -- Renal ultrasound\par -- RTC for cystoscopy to complete hematuria workup if urinalysis is concerning\par \par

## 2022-02-03 DIAGNOSIS — Z34.92 ENCOUNTER FOR SUPERVISION OF NORMAL PREGNANCY, UNSPECIFIED, SECOND TRIMESTER: ICD-10-CM

## 2022-02-03 DIAGNOSIS — R31.9 HEMATURIA, UNSPECIFIED: ICD-10-CM

## 2022-02-03 DIAGNOSIS — Z14.8 GENETIC CARRIER OF OTHER DISEASE: ICD-10-CM

## 2022-02-04 LAB
APPEARANCE: CLEAR
BACTERIA UR CULT: NORMAL
BACTERIA: ABNORMAL
BILIRUBIN URINE: NEGATIVE
BLOOD URINE: ABNORMAL
COLOR: YELLOW
GLUCOSE QUALITATIVE U: NEGATIVE
HYALINE CASTS: 3 /LPF
KETONES URINE: NEGATIVE
LEUKOCYTE ESTERASE URINE: NEGATIVE
MICROSCOPIC-UA: NORMAL
NITRITE URINE: NEGATIVE
PH URINE: 6.5
PROTEIN URINE: NEGATIVE
RED BLOOD CELLS URINE: 10 /HPF
SPECIFIC GRAVITY URINE: 1.01
SQUAMOUS EPITHELIAL CELLS: 4 /HPF
URINE COMMENTS: NORMAL
UROBILINOGEN URINE: NORMAL
WHITE BLOOD CELLS URINE: 3 /HPF

## 2022-02-09 ENCOUNTER — APPOINTMENT (OUTPATIENT)
Dept: UROLOGY | Facility: CLINIC | Age: 27
End: 2022-02-09
Payer: MEDICAID

## 2022-02-09 PROCEDURE — 76775 US EXAM ABDO BACK WALL LIM: CPT

## 2022-02-09 PROCEDURE — 99213 OFFICE O/P EST LOW 20 MIN: CPT | Mod: 25

## 2022-02-09 NOTE — REASON FOR VISIT
Progress Notes by Connie Dyer CNA at 04/17/18 04:04 PM     Author:  Connie Dyer CNA Service:  (none) Author Type:  Certified Nursing Assistant     Filed:  04/17/18 04:12 PM Encounter Date:  4/17/2018 Status:  Signed     :  Connie Dyer CNA (Certified Nursing Assistant)            Exam completed and forwarded to reading physician WO[KS1.1T].  Patient requested the left leg only be scanned today since his right leg was scanned 3/18 at [facility].  New order for unilateral exam being added by Giselle Raymond RN.[KS1.1M]      Revision History        User Key Date/Time User Provider Type Action    > KS1.1 04/17/18 04:12 PM Connie Dyer CNA Certified Nursing Assistant Sign    M - Manual, T - Template            
[Follow-up Visit ___] : a follow-up visit  for [unfilled]

## 2022-02-09 NOTE — HISTORY OF PRESENT ILLNESS
[FreeTextEntry1] : Very pleasant 26-year-old woman who presents for follow-up of microscopic hematuria.  Recent urinalysis demonstrated 10 red blood cells per high-powered field.  She underwent a renal ultrasound today which demonstrates no kidney stones, hydronephrosis, renal masses.  She denies dysuria.  Urine culture negative.\par \par She is 13 weeks pregnant.

## 2022-02-09 NOTE — ASSESSMENT
[FreeTextEntry1] : Very pleasant 26-year-old woman who presents for follow-up of microscopic hematuria\par -Ultrasound images reviewed demonstrating no kidney stones, hydronephrosis, renal masses\par -Given low risk for urologic malignancy we will forego a cystoscopy at this time\par -Repeat urinalysis in 1 month

## 2022-02-14 LAB
CLARI ADDITIONAL INFO: NORMAL
CLARI CHROMOSOME 13: NORMAL
CLARI CHROMOSOME 18: NORMAL
CLARI CHROMOSOME 21: NORMAL
CLARI SEX CHROMOSOMES: NORMAL
CLARITEST NIPT: NORMAL
MATERNAL WEIGHT (LBS):: 170
PLEASE INCLUDE GENDER RESULTS ON THIS REPORT:: NORMAL
TYPE OF PREGNANCY:: NORMAL

## 2022-02-28 ENCOUNTER — APPOINTMENT (OUTPATIENT)
Dept: OBGYN | Facility: CLINIC | Age: 27
End: 2022-02-28
Payer: MEDICAID

## 2022-02-28 ENCOUNTER — OUTPATIENT (OUTPATIENT)
Dept: OUTPATIENT SERVICES | Facility: HOSPITAL | Age: 27
LOS: 1 days | End: 2022-02-28
Payer: MEDICAID

## 2022-02-28 VITALS
HEART RATE: 97 BPM | TEMPERATURE: 97.9 F | HEIGHT: 62 IN | WEIGHT: 174 LBS | BODY MASS INDEX: 32.02 KG/M2 | OXYGEN SATURATION: 100 % | RESPIRATION RATE: 18 BRPM | DIASTOLIC BLOOD PRESSURE: 73 MMHG | SYSTOLIC BLOOD PRESSURE: 109 MMHG

## 2022-02-28 DIAGNOSIS — Z98.89 OTHER SPECIFIED POSTPROCEDURAL STATES: Chronic | ICD-10-CM

## 2022-02-28 DIAGNOSIS — Z34.00 ENCOUNTER FOR SUPERVISION OF NORMAL FIRST PREGNANCY, UNSPECIFIED TRIMESTER: ICD-10-CM

## 2022-02-28 PROCEDURE — 36415 COLL VENOUS BLD VENIPUNCTURE: CPT

## 2022-02-28 PROCEDURE — 99214 OFFICE O/P EST MOD 30 MIN: CPT | Mod: 25

## 2022-02-28 PROCEDURE — 36415 COLL VENOUS BLD VENIPUNCTURE: CPT | Mod: NC

## 2022-02-28 PROCEDURE — 81003 URINALYSIS AUTO W/O SCOPE: CPT

## 2022-02-28 PROCEDURE — 85025 COMPLETE CBC W/AUTO DIFF WBC: CPT

## 2022-02-28 PROCEDURE — 86336 INHIBIN A: CPT

## 2022-02-28 PROCEDURE — G0463: CPT

## 2022-03-01 DIAGNOSIS — Z34.92 ENCOUNTER FOR SUPERVISION OF NORMAL PREGNANCY, UNSPECIFIED, SECOND TRIMESTER: ICD-10-CM

## 2022-03-01 DIAGNOSIS — Z14.8 GENETIC CARRIER OF OTHER DISEASE: ICD-10-CM

## 2022-03-01 DIAGNOSIS — Z87.59 PERSONAL HISTORY OF OTHER COMPLICATIONS OF PREGNANCY, CHILDBIRTH AND THE PUERPERIUM: ICD-10-CM

## 2022-03-01 DIAGNOSIS — R31.9 HEMATURIA, UNSPECIFIED: ICD-10-CM

## 2022-03-01 DIAGNOSIS — Z3A.16 16 WEEKS GESTATION OF PREGNANCY: ICD-10-CM

## 2022-03-01 LAB
BASOPHILS # BLD AUTO: 0.07 K/UL
BASOPHILS NFR BLD AUTO: 0.7 %
EOSINOPHIL # BLD AUTO: 0.52 K/UL
EOSINOPHIL NFR BLD AUTO: 4.9 %
HCT VFR BLD CALC: 34.4 %
HGB BLD-MCNC: 10.8 G/DL
IMM GRANULOCYTES NFR BLD AUTO: 0.3 %
LYMPHOCYTES # BLD AUTO: 2.01 K/UL
LYMPHOCYTES NFR BLD AUTO: 18.9 %
MAN DIFF?: NORMAL
MCHC RBC-ENTMCNC: 27.6 PG
MCHC RBC-ENTMCNC: 31.4 GM/DL
MCV RBC AUTO: 87.8 FL
MONOCYTES # BLD AUTO: 0.5 K/UL
MONOCYTES NFR BLD AUTO: 4.7 %
NEUTROPHILS # BLD AUTO: 7.5 K/UL
NEUTROPHILS NFR BLD AUTO: 70.5 %
PLATELET # BLD AUTO: 245 K/UL
RBC # BLD: 3.92 M/UL
RBC # FLD: 14.1 %
WBC # FLD AUTO: 10.63 K/UL

## 2022-03-09 LAB
1ST TRIMESTER DATA: NORMAL
2ND TRIMESTER DATA: NORMAL
ADDENDUM DOC: NORMAL
AFP PNL SERPL: NORMAL
AFP SERPL-ACNC: NORMAL
AFP SERPL-ACNC: NORMAL
B-HCG FREE SERPL-MCNC: NORMAL
CLINICAL BIOCHEMIST REVIEW: NORMAL
FREE BETA HCG 1ST TRIMESTER: NORMAL
INHIBIN A SERPL-MCNC: NORMAL
NASAL BONE: PRESENT
NOTES NTD: NORMAL
NT: NORMAL
PAPP-A SERPL-ACNC: NORMAL
U ESTRIOL SERPL-SCNC: NORMAL

## 2022-03-25 ENCOUNTER — ASOB RESULT (OUTPATIENT)
Age: 27
End: 2022-03-25

## 2022-03-25 ENCOUNTER — APPOINTMENT (OUTPATIENT)
Dept: ANTEPARTUM | Facility: CLINIC | Age: 27
End: 2022-03-25
Payer: MEDICAID

## 2022-03-25 PROCEDURE — 76811 OB US DETAILED SNGL FETUS: CPT

## 2022-03-28 ENCOUNTER — OUTPATIENT (OUTPATIENT)
Dept: OUTPATIENT SERVICES | Facility: HOSPITAL | Age: 27
LOS: 1 days | End: 2022-03-28
Payer: MEDICAID

## 2022-03-28 ENCOUNTER — APPOINTMENT (OUTPATIENT)
Dept: OBGYN | Facility: CLINIC | Age: 27
End: 2022-03-28
Payer: MEDICAID

## 2022-03-28 VITALS
HEART RATE: 105 BPM | SYSTOLIC BLOOD PRESSURE: 104 MMHG | DIASTOLIC BLOOD PRESSURE: 63 MMHG | RESPIRATION RATE: 18 BRPM | TEMPERATURE: 97.7 F | HEIGHT: 62 IN

## 2022-03-28 DIAGNOSIS — Z34.00 ENCOUNTER FOR SUPERVISION OF NORMAL FIRST PREGNANCY, UNSPECIFIED TRIMESTER: ICD-10-CM

## 2022-03-28 DIAGNOSIS — Z98.89 OTHER SPECIFIED POSTPROCEDURAL STATES: Chronic | ICD-10-CM

## 2022-03-28 PROCEDURE — 87086 URINE CULTURE/COLONY COUNT: CPT

## 2022-03-28 PROCEDURE — 99214 OFFICE O/P EST MOD 30 MIN: CPT

## 2022-03-28 PROCEDURE — G0463: CPT

## 2022-03-28 PROCEDURE — 81003 URINALYSIS AUTO W/O SCOPE: CPT

## 2022-03-30 DIAGNOSIS — Z14.8 GENETIC CARRIER OF OTHER DISEASE: ICD-10-CM

## 2022-03-30 DIAGNOSIS — Z3A.20 20 WEEKS GESTATION OF PREGNANCY: ICD-10-CM

## 2022-03-30 DIAGNOSIS — Z34.92 ENCOUNTER FOR SUPERVISION OF NORMAL PREGNANCY, UNSPECIFIED, SECOND TRIMESTER: ICD-10-CM

## 2022-03-30 DIAGNOSIS — R31.9 HEMATURIA, UNSPECIFIED: ICD-10-CM

## 2022-03-30 DIAGNOSIS — Z87.59 PERSONAL HISTORY OF OTHER COMPLICATIONS OF PREGNANCY, CHILDBIRTH AND THE PUERPERIUM: ICD-10-CM

## 2022-03-30 LAB — BACTERIA UR CULT: NORMAL

## 2022-04-11 ENCOUNTER — ASOB RESULT (OUTPATIENT)
Age: 27
End: 2022-04-11

## 2022-04-11 ENCOUNTER — APPOINTMENT (OUTPATIENT)
Dept: ANTEPARTUM | Facility: CLINIC | Age: 27
End: 2022-04-11
Payer: MEDICAID

## 2022-04-11 PROCEDURE — 76815 OB US LIMITED FETUS(S): CPT

## 2022-04-25 ENCOUNTER — APPOINTMENT (OUTPATIENT)
Dept: OBGYN | Facility: CLINIC | Age: 27
End: 2022-04-25
Payer: MEDICAID

## 2022-04-25 ENCOUNTER — OUTPATIENT (OUTPATIENT)
Dept: OUTPATIENT SERVICES | Facility: HOSPITAL | Age: 27
LOS: 1 days | End: 2022-04-25
Payer: MEDICAID

## 2022-04-25 VITALS
HEIGHT: 62 IN | WEIGHT: 184 LBS | HEART RATE: 84 BPM | RESPIRATION RATE: 18 BRPM | BODY MASS INDEX: 33.86 KG/M2 | DIASTOLIC BLOOD PRESSURE: 55 MMHG | SYSTOLIC BLOOD PRESSURE: 94 MMHG | OXYGEN SATURATION: 99 % | TEMPERATURE: 97.3 F

## 2022-04-25 DIAGNOSIS — Z34.00 ENCOUNTER FOR SUPERVISION OF NORMAL FIRST PREGNANCY, UNSPECIFIED TRIMESTER: ICD-10-CM

## 2022-04-25 DIAGNOSIS — Z98.89 OTHER SPECIFIED POSTPROCEDURAL STATES: Chronic | ICD-10-CM

## 2022-04-25 PROCEDURE — 99214 OFFICE O/P EST MOD 30 MIN: CPT | Mod: 25

## 2022-04-25 PROCEDURE — 36415 COLL VENOUS BLD VENIPUNCTURE: CPT

## 2022-04-25 PROCEDURE — 82950 GLUCOSE TEST: CPT

## 2022-04-25 PROCEDURE — 36415 COLL VENOUS BLD VENIPUNCTURE: CPT | Mod: NC

## 2022-04-25 PROCEDURE — 83036 HEMOGLOBIN GLYCOSYLATED A1C: CPT

## 2022-04-25 PROCEDURE — 86780 TREPONEMA PALLIDUM: CPT

## 2022-04-25 PROCEDURE — 85025 COMPLETE CBC W/AUTO DIFF WBC: CPT

## 2022-04-25 PROCEDURE — G0463: CPT

## 2022-04-25 PROCEDURE — 87086 URINE CULTURE/COLONY COUNT: CPT

## 2022-04-25 PROCEDURE — 81003 URINALYSIS AUTO W/O SCOPE: CPT

## 2022-04-26 LAB
BASOPHILS # BLD AUTO: 0.04 K/UL
BASOPHILS NFR BLD AUTO: 0.4 %
EOSINOPHIL # BLD AUTO: 0.35 K/UL
EOSINOPHIL NFR BLD AUTO: 3.8 %
ESTIMATED AVERAGE GLUCOSE: 108 MG/DL
GLUCOSE 1H P 50 G GLC PO SERPL-MCNC: 125 MG/DL
HBA1C MFR BLD HPLC: 5.4 %
HCT VFR BLD CALC: 31.8 %
HGB BLD-MCNC: 9.9 G/DL
IMM GRANULOCYTES NFR BLD AUTO: 0.3 %
LYMPHOCYTES # BLD AUTO: 1.53 K/UL
LYMPHOCYTES NFR BLD AUTO: 16.7 %
MAN DIFF?: NORMAL
MCHC RBC-ENTMCNC: 28.1 PG
MCHC RBC-ENTMCNC: 31.1 GM/DL
MCV RBC AUTO: 90.3 FL
MONOCYTES # BLD AUTO: 0.48 K/UL
MONOCYTES NFR BLD AUTO: 5.2 %
NEUTROPHILS # BLD AUTO: 6.74 K/UL
NEUTROPHILS NFR BLD AUTO: 73.6 %
PLATELET # BLD AUTO: 231 K/UL
RBC # BLD: 3.52 M/UL
RBC # FLD: 14.4 %
T PALLIDUM AB SER QL IA: NEGATIVE
WBC # FLD AUTO: 9.17 K/UL

## 2022-04-27 LAB — BACTERIA UR CULT: NORMAL

## 2022-04-29 DIAGNOSIS — R31.9 HEMATURIA, UNSPECIFIED: ICD-10-CM

## 2022-04-29 DIAGNOSIS — Z3A.24 24 WEEKS GESTATION OF PREGNANCY: ICD-10-CM

## 2022-04-29 DIAGNOSIS — Z14.8 GENETIC CARRIER OF OTHER DISEASE: ICD-10-CM

## 2022-04-29 DIAGNOSIS — Z34.92 ENCOUNTER FOR SUPERVISION OF NORMAL PREGNANCY, UNSPECIFIED, SECOND TRIMESTER: ICD-10-CM

## 2022-04-29 DIAGNOSIS — Z87.59 PERSONAL HISTORY OF OTHER COMPLICATIONS OF PREGNANCY, CHILDBIRTH AND THE PUERPERIUM: ICD-10-CM

## 2022-05-13 ENCOUNTER — APPOINTMENT (OUTPATIENT)
Dept: PULMONOLOGY | Facility: CLINIC | Age: 27
End: 2022-05-13
Payer: MEDICAID

## 2022-05-13 VITALS
TEMPERATURE: 97.9 F | WEIGHT: 186 LBS | BODY MASS INDEX: 34.23 KG/M2 | SYSTOLIC BLOOD PRESSURE: 108 MMHG | RESPIRATION RATE: 16 BRPM | HEART RATE: 90 BPM | DIASTOLIC BLOOD PRESSURE: 70 MMHG | HEIGHT: 62 IN | OXYGEN SATURATION: 95 %

## 2022-05-13 PROCEDURE — 99203 OFFICE O/P NEW LOW 30 MIN: CPT

## 2022-05-14 NOTE — PHYSICAL EXAM
[Well Nourished] : well nourished [Well Developed] : well developed [Normal Oropharynx] : normal oropharynx [Enlarged Base of the Tongue] : enlarged base of the tongue [Normal Appearance] : normal appearance [No Neck Mass] : no neck mass [Normal Rate/Rhythm] : normal rate/rhythm [Normal S1, S2] : normal s1, s2 [No Murmurs] : no murmurs [No Resp Distress] : no resp distress [Wheeze] : wheeze [Benign] : benign [Not Tender] : not tender [No HSM] : no hsm [No Clubbing] : no clubbing [Oriented x3] : oriented x3 [TextBox_2] : cough [TextBox_68] : sqeaks

## 2022-05-14 NOTE — HISTORY OF PRESENT ILLNESS
[TextBox_4] : 26 year old patient presents for evaluation of dyspnea.\par \par she is having increased wheeze, cough, sputum production\par \par She has had symptoms for 3-4 days.  Prior, she was having seasonal allergy symptoms\par \par \par \par She is 27 weeks pregnant\par \par \par She had asthma as child, used Advair and albuterol.  She last use meds 10 years ago\par \par She has seasonal allergy\par \par She has some skin rash at times\par \par has been having heartburn and nasal congestion\par \par \par PSH:\par \par none\par \par \par PMH:\par \par childhood asthma\par \par \par \par \par SH:\par \par never smoker\par \par ETOH:  none\par \par \par No exposure to chemicals, dust, asbestos, mold\par \par \par ALLERGY:\par \par NKDA\par \par allergic to \par \par Reaction is \par \par environmental/seasonal allergy:\par \par \par \par Review of Systems:\par \par No rash, skin problems\par No dry eyes\par no mouth ulcers\par no sinusitis, sinus infections, nasal obstruction\par no dysphagia\par no dry mouth\par \par no arthritis\par no joint aches\par no joint swelling\par \par \par no pneumonia\par no wheeze\par no lung cancer\par \par no CAD\par no MI\par no chest pain\par no murmur\par no CHF\par no HTN\par no edema\par \par no peptic ulcer or gastritis\par no GERD\par no abdominal pain\par no liver disease\par \par no Diabetes\par no thyroid disease\par no hyperlipidemia\par \par \par no bleeding\par \par no DVT or PE\par \par no kidney disease\par \par no stroke\par no seizure\par \par \par \par \par \par \par \par \par \par \par \par

## 2022-05-23 ENCOUNTER — APPOINTMENT (OUTPATIENT)
Dept: OBGYN | Facility: CLINIC | Age: 27
End: 2022-05-23
Payer: MEDICAID

## 2022-05-23 ENCOUNTER — OUTPATIENT (OUTPATIENT)
Dept: OUTPATIENT SERVICES | Facility: HOSPITAL | Age: 27
LOS: 1 days | End: 2022-05-23
Payer: MEDICAID

## 2022-05-23 VITALS
HEIGHT: 62 IN | OXYGEN SATURATION: 96 % | DIASTOLIC BLOOD PRESSURE: 58 MMHG | HEART RATE: 97 BPM | SYSTOLIC BLOOD PRESSURE: 95 MMHG | WEIGHT: 188 LBS | BODY MASS INDEX: 34.6 KG/M2 | RESPIRATION RATE: 18 BRPM | TEMPERATURE: 97.4 F

## 2022-05-23 DIAGNOSIS — Z34.93 ENCOUNTER FOR SUPERVISION OF NORMAL PREGNANCY, UNSPECIFIED, THIRD TRIMESTER: ICD-10-CM

## 2022-05-23 DIAGNOSIS — Z23 ENCOUNTER FOR IMMUNIZATION: ICD-10-CM

## 2022-05-23 DIAGNOSIS — Z34.00 ENCOUNTER FOR SUPERVISION OF NORMAL FIRST PREGNANCY, UNSPECIFIED TRIMESTER: ICD-10-CM

## 2022-05-23 DIAGNOSIS — Z98.89 OTHER SPECIFIED POSTPROCEDURAL STATES: Chronic | ICD-10-CM

## 2022-05-23 DIAGNOSIS — Z14.8 GENETIC CARRIER OF OTHER DISEASE: ICD-10-CM

## 2022-05-23 DIAGNOSIS — Z34.92 ENCOUNTER FOR SUPERVISION OF NORMAL PREGNANCY, UNSPECIFIED, SECOND TRIMESTER: ICD-10-CM

## 2022-05-23 DIAGNOSIS — R31.9 HEMATURIA, UNSPECIFIED: ICD-10-CM

## 2022-05-23 DIAGNOSIS — Z87.59 PERSONAL HISTORY OF OTHER COMPLICATIONS OF PREGNANCY, CHILDBIRTH AND THE PUERPERIUM: ICD-10-CM

## 2022-05-23 DIAGNOSIS — J45.901 UNSPECIFIED ASTHMA WITH (ACUTE) EXACERBATION: ICD-10-CM

## 2022-05-23 PROCEDURE — 80053 COMPREHEN METABOLIC PANEL: CPT

## 2022-05-23 PROCEDURE — G0463: CPT

## 2022-05-23 PROCEDURE — 85025 COMPLETE CBC W/AUTO DIFF WBC: CPT

## 2022-05-23 PROCEDURE — 81003 URINALYSIS AUTO W/O SCOPE: CPT

## 2022-05-23 PROCEDURE — 84550 ASSAY OF BLOOD/URIC ACID: CPT

## 2022-05-23 PROCEDURE — 57454 BX/CURETT OF CERVIX W/SCOPE: CPT | Mod: NC

## 2022-05-23 PROCEDURE — 99214 OFFICE O/P EST MOD 30 MIN: CPT | Mod: 25

## 2022-05-23 PROCEDURE — 90471 IMMUNIZATION ADMIN: CPT | Mod: NC

## 2022-05-23 PROCEDURE — 87086 URINE CULTURE/COLONY COUNT: CPT

## 2022-05-23 PROCEDURE — 84156 ASSAY OF PROTEIN URINE: CPT

## 2022-05-27 ENCOUNTER — APPOINTMENT (OUTPATIENT)
Dept: PULMONOLOGY | Facility: CLINIC | Age: 27
End: 2022-05-27

## 2022-05-27 LAB
ALBUMIN SERPL ELPH-MCNC: 3.7 G/DL
ALP BLD-CCNC: 86 U/L
ALT SERPL-CCNC: 13 U/L
ANION GAP SERPL CALC-SCNC: 13 MMOL/L
AST SERPL-CCNC: 13 U/L
BACTERIA UR CULT: NORMAL
BASOPHILS # BLD AUTO: 0.07 K/UL
BASOPHILS NFR BLD AUTO: 0.6 %
BILIRUB SERPL-MCNC: <0.2 MG/DL
BUN SERPL-MCNC: 8 MG/DL
CALCIUM SERPL-MCNC: 9 MG/DL
CHLORIDE SERPL-SCNC: 102 MMOL/L
CO2 SERPL-SCNC: 21 MMOL/L
CREAT SERPL-MCNC: 0.55 MG/DL
CREAT SPEC-SCNC: 87 MG/DL
CREAT/PROT UR: 0.4 RATIO
EGFR: 130 ML/MIN/1.73M2
EOSINOPHIL # BLD AUTO: 0.27 K/UL
EOSINOPHIL NFR BLD AUTO: 2.4 %
GLUCOSE SERPL-MCNC: 104 MG/DL
HCT VFR BLD CALC: 33 %
HGB BLD-MCNC: 10.4 G/DL
IMM GRANULOCYTES NFR BLD AUTO: 1 %
LYMPHOCYTES # BLD AUTO: 2.06 K/UL
LYMPHOCYTES NFR BLD AUTO: 18 %
MAN DIFF?: NORMAL
MCHC RBC-ENTMCNC: 28.1 PG
MCHC RBC-ENTMCNC: 31.5 GM/DL
MCV RBC AUTO: 89.2 FL
MONOCYTES # BLD AUTO: 0.54 K/UL
MONOCYTES NFR BLD AUTO: 4.7 %
NEUTROPHILS # BLD AUTO: 8.4 K/UL
NEUTROPHILS NFR BLD AUTO: 73.3 %
PLATELET # BLD AUTO: 242 K/UL
POTASSIUM SERPL-SCNC: 3.9 MMOL/L
PROT SERPL-MCNC: 6.2 G/DL
PROT UR-MCNC: 33 MG/DL
RBC # BLD: 3.7 M/UL
RBC # FLD: 14.1 %
SODIUM SERPL-SCNC: 136 MMOL/L
URATE SERPL-MCNC: 4.8 MG/DL
WBC # FLD AUTO: 11.45 K/UL

## 2022-06-06 ENCOUNTER — LABORATORY RESULT (OUTPATIENT)
Age: 27
End: 2022-06-06

## 2022-06-06 ENCOUNTER — APPOINTMENT (OUTPATIENT)
Dept: OBGYN | Facility: CLINIC | Age: 27
End: 2022-06-06
Payer: MEDICAID

## 2022-06-06 ENCOUNTER — OUTPATIENT (OUTPATIENT)
Dept: OUTPATIENT SERVICES | Facility: HOSPITAL | Age: 27
LOS: 1 days | End: 2022-06-06
Payer: MEDICAID

## 2022-06-06 VITALS
OXYGEN SATURATION: 98 % | HEART RATE: 98 BPM | TEMPERATURE: 97.2 F | BODY MASS INDEX: 35.15 KG/M2 | HEIGHT: 62 IN | WEIGHT: 191 LBS | RESPIRATION RATE: 16 BRPM | SYSTOLIC BLOOD PRESSURE: 100 MMHG | DIASTOLIC BLOOD PRESSURE: 65 MMHG

## 2022-06-06 DIAGNOSIS — Z98.89 OTHER SPECIFIED POSTPROCEDURAL STATES: Chronic | ICD-10-CM

## 2022-06-06 DIAGNOSIS — Z34.00 ENCOUNTER FOR SUPERVISION OF NORMAL FIRST PREGNANCY, UNSPECIFIED TRIMESTER: ICD-10-CM

## 2022-06-06 PROCEDURE — 81003 URINALYSIS AUTO W/O SCOPE: CPT

## 2022-06-06 PROCEDURE — 99213 OFFICE O/P EST LOW 20 MIN: CPT

## 2022-06-06 PROCEDURE — G0463: CPT

## 2022-06-09 DIAGNOSIS — R31.9 HEMATURIA, UNSPECIFIED: ICD-10-CM

## 2022-06-09 DIAGNOSIS — Z3A.30 30 WEEKS GESTATION OF PREGNANCY: ICD-10-CM

## 2022-06-09 DIAGNOSIS — Z34.93 ENCOUNTER FOR SUPERVISION OF NORMAL PREGNANCY, UNSPECIFIED, THIRD TRIMESTER: ICD-10-CM

## 2022-06-20 ENCOUNTER — APPOINTMENT (OUTPATIENT)
Dept: ANTEPARTUM | Facility: CLINIC | Age: 27
End: 2022-06-20
Payer: MEDICAID

## 2022-06-20 ENCOUNTER — ASOB RESULT (OUTPATIENT)
Age: 27
End: 2022-06-20

## 2022-06-20 PROCEDURE — 76816 OB US FOLLOW-UP PER FETUS: CPT

## 2022-06-21 ENCOUNTER — NON-APPOINTMENT (OUTPATIENT)
Age: 27
End: 2022-06-21

## 2022-06-22 ENCOUNTER — OUTPATIENT (OUTPATIENT)
Dept: OUTPATIENT SERVICES | Facility: HOSPITAL | Age: 27
LOS: 1 days | End: 2022-06-22
Payer: MEDICAID

## 2022-06-22 ENCOUNTER — APPOINTMENT (OUTPATIENT)
Dept: OBGYN | Facility: CLINIC | Age: 27
End: 2022-06-22
Payer: MEDICAID

## 2022-06-22 VITALS
TEMPERATURE: 97.2 F | OXYGEN SATURATION: 98 % | RESPIRATION RATE: 16 BRPM | WEIGHT: 193 LBS | HEIGHT: 62 IN | HEART RATE: 94 BPM | SYSTOLIC BLOOD PRESSURE: 99 MMHG | BODY MASS INDEX: 35.51 KG/M2 | DIASTOLIC BLOOD PRESSURE: 66 MMHG

## 2022-06-22 DIAGNOSIS — Z23 ENCOUNTER FOR IMMUNIZATION: ICD-10-CM

## 2022-06-22 DIAGNOSIS — Z34.00 ENCOUNTER FOR SUPERVISION OF NORMAL FIRST PREGNANCY, UNSPECIFIED TRIMESTER: ICD-10-CM

## 2022-06-22 DIAGNOSIS — Z71.85 ENCOUNTER FOR IMMUNIZATION SAFETY COUNSELING: ICD-10-CM

## 2022-06-22 DIAGNOSIS — Z98.89 OTHER SPECIFIED POSTPROCEDURAL STATES: Chronic | ICD-10-CM

## 2022-06-22 PROCEDURE — 99214 OFFICE O/P EST MOD 30 MIN: CPT

## 2022-06-22 PROCEDURE — 36415 COLL VENOUS BLD VENIPUNCTURE: CPT

## 2022-06-22 PROCEDURE — G0463: CPT

## 2022-06-22 PROCEDURE — 84156 ASSAY OF PROTEIN URINE: CPT

## 2022-06-22 PROCEDURE — 84550 ASSAY OF BLOOD/URIC ACID: CPT

## 2022-06-22 PROCEDURE — 80053 COMPREHEN METABOLIC PANEL: CPT

## 2022-06-22 PROCEDURE — 85025 COMPLETE CBC W/AUTO DIFF WBC: CPT

## 2022-06-22 PROCEDURE — 81003 URINALYSIS AUTO W/O SCOPE: CPT

## 2022-06-27 DIAGNOSIS — Z34.93 ENCOUNTER FOR SUPERVISION OF NORMAL PREGNANCY, UNSPECIFIED, THIRD TRIMESTER: ICD-10-CM

## 2022-06-27 DIAGNOSIS — Z87.59 PERSONAL HISTORY OF OTHER COMPLICATIONS OF PREGNANCY, CHILDBIRTH AND THE PUERPERIUM: ICD-10-CM

## 2022-06-27 DIAGNOSIS — J45.901 UNSPECIFIED ASTHMA WITH (ACUTE) EXACERBATION: ICD-10-CM

## 2022-06-27 DIAGNOSIS — Z14.8 GENETIC CARRIER OF OTHER DISEASE: ICD-10-CM

## 2022-06-27 DIAGNOSIS — R31.9 HEMATURIA, UNSPECIFIED: ICD-10-CM

## 2022-06-27 DIAGNOSIS — Z3A.32 32 WEEKS GESTATION OF PREGNANCY: ICD-10-CM

## 2022-06-27 LAB
ALBUMIN SERPL ELPH-MCNC: 3.5 G/DL
ALP BLD-CCNC: 103 U/L
ALT SERPL-CCNC: 13 U/L
ANION GAP SERPL CALC-SCNC: 12 MMOL/L
AST SERPL-CCNC: 11 U/L
BASOPHILS # BLD AUTO: 0.04 K/UL
BASOPHILS NFR BLD AUTO: 0.5 %
BILIRUB SERPL-MCNC: <0.2 MG/DL
BUN SERPL-MCNC: 8 MG/DL
CALCIUM SERPL-MCNC: 9.1 MG/DL
CHLORIDE SERPL-SCNC: 105 MMOL/L
CO2 SERPL-SCNC: 22 MMOL/L
CREAT SERPL-MCNC: 0.59 MG/DL
CREAT SPEC-SCNC: 57 MG/DL
CREAT/PROT UR: 0.4 RATIO
EGFR: 127 ML/MIN/1.73M2
EOSINOPHIL # BLD AUTO: 0.18 K/UL
EOSINOPHIL NFR BLD AUTO: 2 %
GLUCOSE SERPL-MCNC: 140 MG/DL
HCT VFR BLD CALC: 32 %
HGB BLD-MCNC: 10.2 G/DL
IMM GRANULOCYTES NFR BLD AUTO: 0.5 %
LYMPHOCYTES # BLD AUTO: 1.63 K/UL
LYMPHOCYTES NFR BLD AUTO: 18.4 %
MAN DIFF?: NORMAL
MCHC RBC-ENTMCNC: 28.1 PG
MCHC RBC-ENTMCNC: 31.9 GM/DL
MCV RBC AUTO: 88.2 FL
MONOCYTES # BLD AUTO: 0.47 K/UL
MONOCYTES NFR BLD AUTO: 5.3 %
NEUTROPHILS # BLD AUTO: 6.49 K/UL
NEUTROPHILS NFR BLD AUTO: 73.3 %
PLATELET # BLD AUTO: 217 K/UL
POTASSIUM SERPL-SCNC: 4.3 MMOL/L
PROT SERPL-MCNC: 5.7 G/DL
PROT UR-MCNC: 23 MG/DL
RBC # BLD: 3.63 M/UL
RBC # FLD: 14.8 %
SODIUM SERPL-SCNC: 138 MMOL/L
URATE SERPL-MCNC: 4.8 MG/DL
WBC # FLD AUTO: 8.85 K/UL

## 2022-07-06 ENCOUNTER — OUTPATIENT (OUTPATIENT)
Dept: OUTPATIENT SERVICES | Facility: HOSPITAL | Age: 27
LOS: 1 days | End: 2022-07-06
Payer: MEDICAID

## 2022-07-06 ENCOUNTER — APPOINTMENT (OUTPATIENT)
Dept: OBGYN | Facility: CLINIC | Age: 27
End: 2022-07-06

## 2022-07-06 VITALS
DIASTOLIC BLOOD PRESSURE: 57 MMHG | HEIGHT: 62 IN | TEMPERATURE: 97.3 F | SYSTOLIC BLOOD PRESSURE: 94 MMHG | OXYGEN SATURATION: 97 % | RESPIRATION RATE: 18 BRPM | HEART RATE: 111 BPM | WEIGHT: 196 LBS | BODY MASS INDEX: 36.07 KG/M2

## 2022-07-06 DIAGNOSIS — Z34.00 ENCOUNTER FOR SUPERVISION OF NORMAL FIRST PREGNANCY, UNSPECIFIED TRIMESTER: ICD-10-CM

## 2022-07-06 DIAGNOSIS — Z98.89 OTHER SPECIFIED POSTPROCEDURAL STATES: Chronic | ICD-10-CM

## 2022-07-06 PROCEDURE — 36415 COLL VENOUS BLD VENIPUNCTURE: CPT | Mod: NC

## 2022-07-06 PROCEDURE — 81003 URINALYSIS AUTO W/O SCOPE: CPT

## 2022-07-06 PROCEDURE — 36415 COLL VENOUS BLD VENIPUNCTURE: CPT

## 2022-07-06 PROCEDURE — 99214 OFFICE O/P EST MOD 30 MIN: CPT

## 2022-07-06 PROCEDURE — 85025 COMPLETE CBC W/AUTO DIFF WBC: CPT

## 2022-07-06 PROCEDURE — G0463: CPT

## 2022-07-06 PROCEDURE — 80053 COMPREHEN METABOLIC PANEL: CPT

## 2022-07-06 PROCEDURE — 84156 ASSAY OF PROTEIN URINE: CPT

## 2022-07-06 PROCEDURE — 84550 ASSAY OF BLOOD/URIC ACID: CPT

## 2022-07-07 DIAGNOSIS — J45.901 UNSPECIFIED ASTHMA WITH (ACUTE) EXACERBATION: ICD-10-CM

## 2022-07-07 DIAGNOSIS — Z3A.34 34 WEEKS GESTATION OF PREGNANCY: ICD-10-CM

## 2022-07-07 DIAGNOSIS — Z34.93 ENCOUNTER FOR SUPERVISION OF NORMAL PREGNANCY, UNSPECIFIED, THIRD TRIMESTER: ICD-10-CM

## 2022-07-07 DIAGNOSIS — R31.9 HEMATURIA, UNSPECIFIED: ICD-10-CM

## 2022-07-07 DIAGNOSIS — Z87.59 PERSONAL HISTORY OF OTHER COMPLICATIONS OF PREGNANCY, CHILDBIRTH AND THE PUERPERIUM: ICD-10-CM

## 2022-07-07 DIAGNOSIS — Z14.8 GENETIC CARRIER OF OTHER DISEASE: ICD-10-CM

## 2022-07-07 LAB
ALBUMIN SERPL ELPH-MCNC: 3.3 G/DL
ALP BLD-CCNC: 110 U/L
ALT SERPL-CCNC: 12 U/L
ANION GAP SERPL CALC-SCNC: 15 MMOL/L
AST SERPL-CCNC: 12 U/L
BASOPHILS # BLD AUTO: 0.03 K/UL
BASOPHILS NFR BLD AUTO: 0.4 %
BILIRUB SERPL-MCNC: <0.2 MG/DL
BUN SERPL-MCNC: 8 MG/DL
CALCIUM SERPL-MCNC: 8.7 MG/DL
CHLORIDE SERPL-SCNC: 104 MMOL/L
CO2 SERPL-SCNC: 19 MMOL/L
CREAT SERPL-MCNC: 0.57 MG/DL
CREAT SPEC-SCNC: 129 MG/DL
CREAT/PROT UR: 0.3 RATIO
EGFR: 128 ML/MIN/1.73M2
EOSINOPHIL # BLD AUTO: 0.17 K/UL
EOSINOPHIL NFR BLD AUTO: 2.1 %
GLUCOSE SERPL-MCNC: 119 MG/DL
HCT VFR BLD CALC: 31.2 %
HGB BLD-MCNC: 10.1 G/DL
IMM GRANULOCYTES NFR BLD AUTO: 0.4 %
LYMPHOCYTES # BLD AUTO: 1.61 K/UL
LYMPHOCYTES NFR BLD AUTO: 19.8 %
MAN DIFF?: NORMAL
MCHC RBC-ENTMCNC: 28.7 PG
MCHC RBC-ENTMCNC: 32.4 GM/DL
MCV RBC AUTO: 88.6 FL
MONOCYTES # BLD AUTO: 0.41 K/UL
MONOCYTES NFR BLD AUTO: 5 %
NEUTROPHILS # BLD AUTO: 5.88 K/UL
NEUTROPHILS NFR BLD AUTO: 72.3 %
PLATELET # BLD AUTO: 205 K/UL
POTASSIUM SERPL-SCNC: 3.6 MMOL/L
PROT SERPL-MCNC: 5.7 G/DL
PROT UR-MCNC: 36 MG/DL
RBC # BLD: 3.52 M/UL
RBC # FLD: 14.6 %
SODIUM SERPL-SCNC: 138 MMOL/L
URATE SERPL-MCNC: 5.5 MG/DL
WBC # FLD AUTO: 8.13 K/UL

## 2022-07-11 ENCOUNTER — OUTPATIENT (OUTPATIENT)
Dept: OUTPATIENT SERVICES | Facility: HOSPITAL | Age: 27
LOS: 1 days | End: 2022-07-11
Payer: MEDICAID

## 2022-07-11 DIAGNOSIS — Z34.00 ENCOUNTER FOR SUPERVISION OF NORMAL FIRST PREGNANCY, UNSPECIFIED TRIMESTER: ICD-10-CM

## 2022-07-11 DIAGNOSIS — Z98.89 OTHER SPECIFIED POSTPROCEDURAL STATES: Chronic | ICD-10-CM

## 2022-07-11 PROCEDURE — 84156 ASSAY OF PROTEIN URINE: CPT

## 2022-07-13 ENCOUNTER — OUTPATIENT (OUTPATIENT)
Dept: OUTPATIENT SERVICES | Facility: HOSPITAL | Age: 27
LOS: 1 days | End: 2022-07-13
Payer: MEDICAID

## 2022-07-13 ENCOUNTER — APPOINTMENT (OUTPATIENT)
Dept: OBGYN | Facility: CLINIC | Age: 27
End: 2022-07-13

## 2022-07-13 VITALS
SYSTOLIC BLOOD PRESSURE: 99 MMHG | HEIGHT: 62 IN | TEMPERATURE: 97.5 F | RESPIRATION RATE: 18 BRPM | BODY MASS INDEX: 35.51 KG/M2 | OXYGEN SATURATION: 98 % | WEIGHT: 193 LBS | DIASTOLIC BLOOD PRESSURE: 66 MMHG | HEART RATE: 91 BPM

## 2022-07-13 DIAGNOSIS — O12.10 GESTATIONAL PROTEINURIA, UNSPECIFIED TRIMESTER: ICD-10-CM

## 2022-07-13 DIAGNOSIS — Z34.00 ENCOUNTER FOR SUPERVISION OF NORMAL FIRST PREGNANCY, UNSPECIFIED TRIMESTER: ICD-10-CM

## 2022-07-13 DIAGNOSIS — Z98.89 OTHER SPECIFIED POSTPROCEDURAL STATES: Chronic | ICD-10-CM

## 2022-07-13 LAB
BASOPHILS # BLD AUTO: 0.04 K/UL
BASOPHILS NFR BLD AUTO: 0.4 %
CREAT 24H UR-MCNC: 1.3 G/24 H
CREAT ?TM UR-MCNC: 118 MG/DL
EOSINOPHIL # BLD AUTO: 0.13 K/UL
EOSINOPHIL NFR BLD AUTO: 1.3 %
HCT VFR BLD CALC: 34.6 %
HGB BLD-MCNC: 11.3 G/DL
IMM GRANULOCYTES NFR BLD AUTO: 0.5 %
LYMPHOCYTES # BLD AUTO: 1.51 K/UL
LYMPHOCYTES NFR BLD AUTO: 15 %
MAN DIFF?: NORMAL
MCHC RBC-ENTMCNC: 28.6 PG
MCHC RBC-ENTMCNC: 32.7 GM/DL
MCV RBC AUTO: 87.6 FL
MONOCYTES # BLD AUTO: 0.47 K/UL
MONOCYTES NFR BLD AUTO: 4.7 %
NEUTROPHILS # BLD AUTO: 7.86 K/UL
NEUTROPHILS NFR BLD AUTO: 78.1 %
PLATELET # BLD AUTO: 219 K/UL
PROT 24H UR-MRATE: 50 MG/DL
PROT ?TM UR-MCNC: 24 HR
PROT UR-MCNC: 550 MG/24 H
RBC # BLD: 3.95 M/UL
RBC # FLD: 14.6 %
SPECIMEN VOL 24H UR: 1100 ML
WBC # FLD AUTO: 10.06 K/UL

## 2022-07-13 PROCEDURE — 84550 ASSAY OF BLOOD/URIC ACID: CPT

## 2022-07-13 PROCEDURE — 36415 COLL VENOUS BLD VENIPUNCTURE: CPT | Mod: NC

## 2022-07-13 PROCEDURE — 99214 OFFICE O/P EST MOD 30 MIN: CPT

## 2022-07-13 PROCEDURE — 81003 URINALYSIS AUTO W/O SCOPE: CPT

## 2022-07-13 PROCEDURE — 85025 COMPLETE CBC W/AUTO DIFF WBC: CPT

## 2022-07-13 PROCEDURE — 80053 COMPREHEN METABOLIC PANEL: CPT

## 2022-07-13 PROCEDURE — G0463: CPT

## 2022-07-13 PROCEDURE — 36415 COLL VENOUS BLD VENIPUNCTURE: CPT

## 2022-07-14 DIAGNOSIS — Z14.8 GENETIC CARRIER OF OTHER DISEASE: ICD-10-CM

## 2022-07-14 DIAGNOSIS — Z34.93 ENCOUNTER FOR SUPERVISION OF NORMAL PREGNANCY, UNSPECIFIED, THIRD TRIMESTER: ICD-10-CM

## 2022-07-14 DIAGNOSIS — J45.901 UNSPECIFIED ASTHMA WITH (ACUTE) EXACERBATION: ICD-10-CM

## 2022-07-14 DIAGNOSIS — Z87.59 PERSONAL HISTORY OF OTHER COMPLICATIONS OF PREGNANCY, CHILDBIRTH AND THE PUERPERIUM: ICD-10-CM

## 2022-07-14 DIAGNOSIS — R31.9 HEMATURIA, UNSPECIFIED: ICD-10-CM

## 2022-07-14 DIAGNOSIS — Z3A.36 36 WEEKS GESTATION OF PREGNANCY: ICD-10-CM

## 2022-07-14 LAB
ALBUMIN SERPL ELPH-MCNC: 3.5 G/DL
ALP BLD-CCNC: 134 U/L
ALT SERPL-CCNC: 10 U/L
ANION GAP SERPL CALC-SCNC: 14 MMOL/L
AST SERPL-CCNC: 12 U/L
BILIRUB SERPL-MCNC: 0.2 MG/DL
BUN SERPL-MCNC: 11 MG/DL
CALCIUM SERPL-MCNC: 9.7 MG/DL
CHLORIDE SERPL-SCNC: 102 MMOL/L
CO2 SERPL-SCNC: 21 MMOL/L
CREAT SERPL-MCNC: 0.68 MG/DL
EGFR: 123 ML/MIN/1.73M2
GLUCOSE SERPL-MCNC: 113 MG/DL
POTASSIUM SERPL-SCNC: 4 MMOL/L
PROT SERPL-MCNC: 6.5 G/DL
SODIUM SERPL-SCNC: 137 MMOL/L
URATE SERPL-MCNC: 6 MG/DL

## 2022-07-20 ENCOUNTER — OUTPATIENT (OUTPATIENT)
Dept: OUTPATIENT SERVICES | Facility: HOSPITAL | Age: 27
LOS: 1 days | End: 2022-07-20
Payer: MEDICAID

## 2022-07-20 ENCOUNTER — APPOINTMENT (OUTPATIENT)
Dept: OBGYN | Facility: CLINIC | Age: 27
End: 2022-07-20

## 2022-07-20 VITALS
RESPIRATION RATE: 18 BRPM | WEIGHT: 199 LBS | DIASTOLIC BLOOD PRESSURE: 54 MMHG | SYSTOLIC BLOOD PRESSURE: 92 MMHG | HEIGHT: 62 IN | TEMPERATURE: 97 F | BODY MASS INDEX: 36.62 KG/M2 | OXYGEN SATURATION: 99 % | HEART RATE: 98 BPM

## 2022-07-20 DIAGNOSIS — Z98.89 OTHER SPECIFIED POSTPROCEDURAL STATES: Chronic | ICD-10-CM

## 2022-07-20 DIAGNOSIS — Z34.00 ENCOUNTER FOR SUPERVISION OF NORMAL FIRST PREGNANCY, UNSPECIFIED TRIMESTER: ICD-10-CM

## 2022-07-20 PROCEDURE — 85025 COMPLETE CBC W/AUTO DIFF WBC: CPT

## 2022-07-20 PROCEDURE — 87653 STREP B DNA AMP PROBE: CPT

## 2022-07-20 PROCEDURE — 87389 HIV-1 AG W/HIV-1&-2 AB AG IA: CPT

## 2022-07-20 PROCEDURE — 84156 ASSAY OF PROTEIN URINE: CPT

## 2022-07-20 PROCEDURE — 81003 URINALYSIS AUTO W/O SCOPE: CPT

## 2022-07-20 PROCEDURE — 36415 COLL VENOUS BLD VENIPUNCTURE: CPT

## 2022-07-20 PROCEDURE — G0463: CPT

## 2022-07-20 PROCEDURE — 80053 COMPREHEN METABOLIC PANEL: CPT

## 2022-07-20 PROCEDURE — 86780 TREPONEMA PALLIDUM: CPT

## 2022-07-20 PROCEDURE — 99214 OFFICE O/P EST MOD 30 MIN: CPT

## 2022-07-20 PROCEDURE — 84550 ASSAY OF BLOOD/URIC ACID: CPT

## 2022-07-21 ENCOUNTER — ASOB RESULT (OUTPATIENT)
Age: 27
End: 2022-07-21

## 2022-07-21 ENCOUNTER — APPOINTMENT (OUTPATIENT)
Dept: ANTEPARTUM | Facility: CLINIC | Age: 27
End: 2022-07-21

## 2022-07-21 VITALS
SYSTOLIC BLOOD PRESSURE: 92 MMHG | HEIGHT: 62 IN | DIASTOLIC BLOOD PRESSURE: 63 MMHG | BODY MASS INDEX: 36.62 KG/M2 | WEIGHT: 199 LBS | HEART RATE: 98 BPM

## 2022-07-21 DIAGNOSIS — Z34.93 ENCOUNTER FOR SUPERVISION OF NORMAL PREGNANCY, UNSPECIFIED, THIRD TRIMESTER: ICD-10-CM

## 2022-07-21 DIAGNOSIS — Z87.59 PERSONAL HISTORY OF OTHER COMPLICATIONS OF PREGNANCY, CHILDBIRTH AND THE PUERPERIUM: ICD-10-CM

## 2022-07-21 DIAGNOSIS — Z14.8 GENETIC CARRIER OF OTHER DISEASE: ICD-10-CM

## 2022-07-21 DIAGNOSIS — Z3A.37 37 WEEKS GESTATION OF PREGNANCY: ICD-10-CM

## 2022-07-21 DIAGNOSIS — R31.9 HEMATURIA, UNSPECIFIED: ICD-10-CM

## 2022-07-21 DIAGNOSIS — J45.901 UNSPECIFIED ASTHMA WITH (ACUTE) EXACERBATION: ICD-10-CM

## 2022-07-21 LAB
ALBUMIN SERPL ELPH-MCNC: 3.5 G/DL
ALP BLD-CCNC: 148 U/L
ALT SERPL-CCNC: 13 U/L
ANION GAP SERPL CALC-SCNC: 19 MMOL/L
AST SERPL-CCNC: 16 U/L
BASOPHILS # BLD AUTO: 0.03 K/UL
BASOPHILS NFR BLD AUTO: 0.4 %
BILIRUB SERPL-MCNC: <0.2 MG/DL
BUN SERPL-MCNC: 7 MG/DL
C TRACH RRNA SPEC QL NAA+PROBE: NOT DETECTED
CALCIUM SERPL-MCNC: 9.3 MG/DL
CHLORIDE SERPL-SCNC: 102 MMOL/L
CO2 SERPL-SCNC: 18 MMOL/L
CREAT SERPL-MCNC: 0.6 MG/DL
CREAT SPEC-SCNC: 171 MG/DL
CREAT/PROT UR: 0.4 RATIO
EGFR: 127 ML/MIN/1.73M2
EOSINOPHIL # BLD AUTO: 0.11 K/UL
EOSINOPHIL NFR BLD AUTO: 1.4 %
GLUCOSE SERPL-MCNC: 33 MG/DL
HCT VFR BLD CALC: 34.6 %
HGB BLD-MCNC: 10.8 G/DL
HIV1+2 AB SPEC QL IA.RAPID: NONREACTIVE
IMM GRANULOCYTES NFR BLD AUTO: 0.3 %
LYMPHOCYTES # BLD AUTO: 1.48 K/UL
LYMPHOCYTES NFR BLD AUTO: 18.7 %
MAN DIFF?: NORMAL
MCHC RBC-ENTMCNC: 28.6 PG
MCHC RBC-ENTMCNC: 31.2 GM/DL
MCV RBC AUTO: 91.8 FL
MONOCYTES # BLD AUTO: 0.42 K/UL
MONOCYTES NFR BLD AUTO: 5.3 %
N GONORRHOEA RRNA SPEC QL NAA+PROBE: NOT DETECTED
NEUTROPHILS # BLD AUTO: 5.86 K/UL
NEUTROPHILS NFR BLD AUTO: 73.9 %
PLATELET # BLD AUTO: 221 K/UL
POTASSIUM SERPL-SCNC: 4.2 MMOL/L
PROT SERPL-MCNC: 6 G/DL
PROT UR-MCNC: 74 MG/DL
RBC # BLD: 3.77 M/UL
RBC # FLD: 15.4 %
SODIUM SERPL-SCNC: 140 MMOL/L
SOURCE AMPLIFICATION: NORMAL
T PALLIDUM AB SER QL IA: NEGATIVE
URATE SERPL-MCNC: 5.4 MG/DL
WBC # FLD AUTO: 7.92 K/UL

## 2022-07-21 PROCEDURE — 99204 OFFICE O/P NEW MOD 45 MIN: CPT

## 2022-07-21 PROCEDURE — 76816 OB US FOLLOW-UP PER FETUS: CPT

## 2022-07-22 LAB
GP B STREP DNA SPEC QL NAA+PROBE: NORMAL
GP B STREP DNA SPEC QL NAA+PROBE: NOT DETECTED
SOURCE GBS: NORMAL

## 2022-07-27 ENCOUNTER — OUTPATIENT (OUTPATIENT)
Dept: OUTPATIENT SERVICES | Facility: HOSPITAL | Age: 27
LOS: 1 days | End: 2022-07-27
Payer: MEDICAID

## 2022-07-27 ENCOUNTER — APPOINTMENT (OUTPATIENT)
Dept: OBGYN | Facility: CLINIC | Age: 27
End: 2022-07-27

## 2022-07-27 VITALS
DIASTOLIC BLOOD PRESSURE: 71 MMHG | HEIGHT: 62 IN | WEIGHT: 196 LBS | HEART RATE: 102 BPM | OXYGEN SATURATION: 96 % | SYSTOLIC BLOOD PRESSURE: 105 MMHG | BODY MASS INDEX: 36.07 KG/M2 | RESPIRATION RATE: 16 BRPM | TEMPERATURE: 98 F

## 2022-07-27 DIAGNOSIS — Z98.89 OTHER SPECIFIED POSTPROCEDURAL STATES: Chronic | ICD-10-CM

## 2022-07-27 DIAGNOSIS — Z34.00 ENCOUNTER FOR SUPERVISION OF NORMAL FIRST PREGNANCY, UNSPECIFIED TRIMESTER: ICD-10-CM

## 2022-07-27 LAB
BASOPHILS # BLD AUTO: 0.03 K/UL
BASOPHILS NFR BLD AUTO: 0.4 %
EOSINOPHIL # BLD AUTO: 0.11 K/UL
EOSINOPHIL NFR BLD AUTO: 1.3 %
HCT VFR BLD CALC: 33.6 %
HGB BLD-MCNC: 11 G/DL
IMM GRANULOCYTES NFR BLD AUTO: 0.2 %
LYMPHOCYTES # BLD AUTO: 1.46 K/UL
LYMPHOCYTES NFR BLD AUTO: 17.5 %
MAN DIFF?: NORMAL
MCHC RBC-ENTMCNC: 28 PG
MCHC RBC-ENTMCNC: 32.7 GM/DL
MCV RBC AUTO: 85.5 FL
MONOCYTES # BLD AUTO: 0.47 K/UL
MONOCYTES NFR BLD AUTO: 5.6 %
NEUTROPHILS # BLD AUTO: 6.24 K/UL
NEUTROPHILS NFR BLD AUTO: 75 %
PLATELET # BLD AUTO: 214 K/UL
RBC # BLD: 3.93 M/UL
RBC # FLD: 15.1 %
WBC # FLD AUTO: 8.33 K/UL

## 2022-07-27 PROCEDURE — 80053 COMPREHEN METABOLIC PANEL: CPT

## 2022-07-27 PROCEDURE — 81003 URINALYSIS AUTO W/O SCOPE: CPT

## 2022-07-27 PROCEDURE — 99214 OFFICE O/P EST MOD 30 MIN: CPT

## 2022-07-27 PROCEDURE — 84550 ASSAY OF BLOOD/URIC ACID: CPT

## 2022-07-27 PROCEDURE — 85025 COMPLETE CBC W/AUTO DIFF WBC: CPT

## 2022-07-27 PROCEDURE — G0463: CPT

## 2022-07-28 LAB
ALBUMIN SERPL ELPH-MCNC: 3.6 G/DL
ALP BLD-CCNC: 150 U/L
ALT SERPL-CCNC: 14 U/L
ANION GAP SERPL CALC-SCNC: 14 MMOL/L
AST SERPL-CCNC: 22 U/L
BILIRUB SERPL-MCNC: <0.2 MG/DL
BUN SERPL-MCNC: 9 MG/DL
CALCIUM SERPL-MCNC: 8.9 MG/DL
CHLORIDE SERPL-SCNC: 105 MMOL/L
CO2 SERPL-SCNC: 21 MMOL/L
CREAT SERPL-MCNC: 0.71 MG/DL
EGFR: 120 ML/MIN/1.73M2
GLUCOSE SERPL-MCNC: 61 MG/DL
POTASSIUM SERPL-SCNC: 4.2 MMOL/L
PROT SERPL-MCNC: 6.1 G/DL
SODIUM SERPL-SCNC: 139 MMOL/L
URATE SERPL-MCNC: 5.8 MG/DL

## 2022-07-29 DIAGNOSIS — Z3A.38 38 WEEKS GESTATION OF PREGNANCY: ICD-10-CM

## 2022-07-29 DIAGNOSIS — J45.901 UNSPECIFIED ASTHMA WITH (ACUTE) EXACERBATION: ICD-10-CM

## 2022-07-29 DIAGNOSIS — Z87.59 PERSONAL HISTORY OF OTHER COMPLICATIONS OF PREGNANCY, CHILDBIRTH AND THE PUERPERIUM: ICD-10-CM

## 2022-07-29 DIAGNOSIS — Z14.8 GENETIC CARRIER OF OTHER DISEASE: ICD-10-CM

## 2022-07-29 DIAGNOSIS — Z34.93 ENCOUNTER FOR SUPERVISION OF NORMAL PREGNANCY, UNSPECIFIED, THIRD TRIMESTER: ICD-10-CM

## 2022-07-29 DIAGNOSIS — R31.9 HEMATURIA, UNSPECIFIED: ICD-10-CM

## 2022-08-03 ENCOUNTER — APPOINTMENT (OUTPATIENT)
Dept: OBGYN | Facility: CLINIC | Age: 27
End: 2022-08-03

## 2022-08-03 ENCOUNTER — OUTPATIENT (OUTPATIENT)
Dept: OUTPATIENT SERVICES | Facility: HOSPITAL | Age: 27
LOS: 1 days | End: 2022-08-03
Payer: MEDICAID

## 2022-08-03 VITALS
RESPIRATION RATE: 18 BRPM | TEMPERATURE: 98.2 F | OXYGEN SATURATION: 98 % | HEART RATE: 83 BPM | DIASTOLIC BLOOD PRESSURE: 59 MMHG | BODY MASS INDEX: 36.99 KG/M2 | SYSTOLIC BLOOD PRESSURE: 97 MMHG | HEIGHT: 62 IN | WEIGHT: 201 LBS

## 2022-08-03 DIAGNOSIS — Z34.00 ENCOUNTER FOR SUPERVISION OF NORMAL FIRST PREGNANCY, UNSPECIFIED TRIMESTER: ICD-10-CM

## 2022-08-03 DIAGNOSIS — Z86.59 PERSONAL HISTORY OF OTHER COMPLICATIONS OF PREGNANCY, CHILDBIRTH AND THE PUERPERIUM: ICD-10-CM

## 2022-08-03 DIAGNOSIS — Z87.448 PERSONAL HISTORY OF OTHER DISEASES OF URINARY SYSTEM: ICD-10-CM

## 2022-08-03 DIAGNOSIS — J45.901 UNSPECIFIED ASTHMA WITH (ACUTE) EXACERBATION: ICD-10-CM

## 2022-08-03 DIAGNOSIS — O12.13 GESTATIONAL PROTEINURIA, THIRD TRIMESTER: ICD-10-CM

## 2022-08-03 DIAGNOSIS — Z98.89 OTHER SPECIFIED POSTPROCEDURAL STATES: Chronic | ICD-10-CM

## 2022-08-03 DIAGNOSIS — Z14.8 GENETIC CARRIER OF OTHER DISEASE: ICD-10-CM

## 2022-08-03 DIAGNOSIS — O99.019 ANEMIA COMPLICATING PREGNANCY, UNSPECIFIED TRIMESTER: ICD-10-CM

## 2022-08-03 DIAGNOSIS — Z34.93 ENCOUNTER FOR SUPERVISION OF NORMAL PREGNANCY, UNSPECIFIED, THIRD TRIMESTER: ICD-10-CM

## 2022-08-03 DIAGNOSIS — Z87.59 PERSONAL HISTORY OF OTHER COMPLICATIONS OF PREGNANCY, CHILDBIRTH AND THE PUERPERIUM: ICD-10-CM

## 2022-08-03 DIAGNOSIS — O99.619 DISEASES OF THE DIGESTIVE SYSTEM COMPLICATING PREGNANCY, UNSPECIFIED TRIMESTER: ICD-10-CM

## 2022-08-03 DIAGNOSIS — K59.00 DISEASES OF THE DIGESTIVE SYSTEM COMPLICATING PREGNANCY, UNSPECIFIED TRIMESTER: ICD-10-CM

## 2022-08-03 PROCEDURE — G0463: CPT

## 2022-08-03 PROCEDURE — 99213 OFFICE O/P EST LOW 20 MIN: CPT

## 2022-08-03 PROCEDURE — 81003 URINALYSIS AUTO W/O SCOPE: CPT

## 2022-08-04 ENCOUNTER — INPATIENT (INPATIENT)
Facility: HOSPITAL | Age: 27
LOS: 1 days | Discharge: HOME | End: 2022-08-06
Attending: OBSTETRICS & GYNECOLOGY | Admitting: OBSTETRICS & GYNECOLOGY
Payer: MEDICAID

## 2022-08-04 VITALS — HEIGHT: 61 IN | WEIGHT: 201.94 LBS

## 2022-08-04 DIAGNOSIS — Z3A.00 WEEKS OF GESTATION OF PREGNANCY NOT SPECIFIED: ICD-10-CM

## 2022-08-04 DIAGNOSIS — Z98.89 OTHER SPECIFIED POSTPROCEDURAL STATES: Chronic | ICD-10-CM

## 2022-08-04 DIAGNOSIS — Z34.80 ENCOUNTER FOR SUPERVISION OF OTHER NORMAL PREGNANCY, UNSPECIFIED TRIMESTER: ICD-10-CM

## 2022-08-04 DIAGNOSIS — O26.899 OTHER SPECIFIED PREGNANCY RELATED CONDITIONS, UNSPECIFIED TRIMESTER: ICD-10-CM

## 2022-08-04 DIAGNOSIS — Z34.93 ENCOUNTER FOR SUPERVISION OF NORMAL PREGNANCY, UNSPECIFIED, THIRD TRIMESTER: ICD-10-CM

## 2022-08-04 DIAGNOSIS — J45.901 UNSPECIFIED ASTHMA WITH (ACUTE) EXACERBATION: ICD-10-CM

## 2022-08-04 DIAGNOSIS — Z3A.38 38 WEEKS GESTATION OF PREGNANCY: ICD-10-CM

## 2022-08-04 DIAGNOSIS — Z14.8 GENETIC CARRIER OF OTHER DISEASE: ICD-10-CM

## 2022-08-04 LAB
ALBUMIN SERPL ELPH-MCNC: 2.2 G/DL — LOW (ref 3.5–5)
ALP SERPL-CCNC: 141 U/L — HIGH (ref 40–120)
ALT FLD-CCNC: 17 U/L DA — SIGNIFICANT CHANGE UP (ref 10–60)
ANION GAP SERPL CALC-SCNC: 11 MMOL/L — SIGNIFICANT CHANGE UP (ref 5–17)
APPEARANCE UR: CLEAR — SIGNIFICANT CHANGE UP
APTT BLD: 24.5 SEC — LOW (ref 27.5–35.5)
AST SERPL-CCNC: 12 U/L — SIGNIFICANT CHANGE UP (ref 10–40)
BASOPHILS # BLD AUTO: 0.05 K/UL — SIGNIFICANT CHANGE UP (ref 0–0.2)
BASOPHILS NFR BLD AUTO: 0.6 % — SIGNIFICANT CHANGE UP (ref 0–2)
BILIRUB SERPL-MCNC: 0.2 MG/DL — SIGNIFICANT CHANGE UP (ref 0.2–1.2)
BILIRUB UR-MCNC: NEGATIVE — SIGNIFICANT CHANGE UP
BUN SERPL-MCNC: 7 MG/DL — SIGNIFICANT CHANGE UP (ref 7–18)
CALCIUM SERPL-MCNC: 8.8 MG/DL — SIGNIFICANT CHANGE UP (ref 8.4–10.5)
CHLORIDE SERPL-SCNC: 108 MMOL/L — SIGNIFICANT CHANGE UP (ref 96–108)
CO2 SERPL-SCNC: 21 MMOL/L — LOW (ref 22–31)
COLOR SPEC: YELLOW — SIGNIFICANT CHANGE UP
CREAT ?TM UR-MCNC: 75 MG/DL — SIGNIFICANT CHANGE UP
CREAT SERPL-MCNC: 0.57 MG/DL — SIGNIFICANT CHANGE UP (ref 0.5–1.3)
DIFF PNL FLD: ABNORMAL
EGFR: 128 ML/MIN/1.73M2 — SIGNIFICANT CHANGE UP
EOSINOPHIL # BLD AUTO: 0.1 K/UL — SIGNIFICANT CHANGE UP (ref 0–0.5)
EOSINOPHIL NFR BLD AUTO: 1.2 % — SIGNIFICANT CHANGE UP (ref 0–6)
FIBRINOGEN PPP-MCNC: 948 MG/DL — HIGH (ref 340–550)
GLUCOSE SERPL-MCNC: 77 MG/DL — SIGNIFICANT CHANGE UP (ref 70–99)
GLUCOSE UR QL: NEGATIVE — SIGNIFICANT CHANGE UP
HCT VFR BLD CALC: 34 % — LOW (ref 34.5–45)
HGB BLD-MCNC: 10.9 G/DL — LOW (ref 11.5–15.5)
IMM GRANULOCYTES NFR BLD AUTO: 0.2 % — SIGNIFICANT CHANGE UP (ref 0–1.5)
INR BLD: 0.98 RATIO — SIGNIFICANT CHANGE UP (ref 0.88–1.16)
KETONES UR-MCNC: NEGATIVE — SIGNIFICANT CHANGE UP
LDH SERPL L TO P-CCNC: 164 U/L — SIGNIFICANT CHANGE UP (ref 120–225)
LEUKOCYTE ESTERASE UR-ACNC: ABNORMAL
LYMPHOCYTES # BLD AUTO: 1.65 K/UL — SIGNIFICANT CHANGE UP (ref 1–3.3)
LYMPHOCYTES # BLD AUTO: 19.5 % — SIGNIFICANT CHANGE UP (ref 13–44)
MCHC RBC-ENTMCNC: 27.9 PG — SIGNIFICANT CHANGE UP (ref 27–34)
MCHC RBC-ENTMCNC: 32.1 GM/DL — SIGNIFICANT CHANGE UP (ref 32–36)
MCV RBC AUTO: 87 FL — SIGNIFICANT CHANGE UP (ref 80–100)
MONOCYTES # BLD AUTO: 0.44 K/UL — SIGNIFICANT CHANGE UP (ref 0–0.9)
MONOCYTES NFR BLD AUTO: 5.2 % — SIGNIFICANT CHANGE UP (ref 2–14)
NEUTROPHILS # BLD AUTO: 6.18 K/UL — SIGNIFICANT CHANGE UP (ref 1.8–7.4)
NEUTROPHILS NFR BLD AUTO: 73.3 % — SIGNIFICANT CHANGE UP (ref 43–77)
NITRITE UR-MCNC: NEGATIVE — SIGNIFICANT CHANGE UP
NRBC # BLD: 0 /100 WBCS — SIGNIFICANT CHANGE UP (ref 0–0)
PH UR: 7 — SIGNIFICANT CHANGE UP (ref 5–8)
PLATELET # BLD AUTO: 216 K/UL — SIGNIFICANT CHANGE UP (ref 150–400)
POTASSIUM SERPL-MCNC: 3.9 MMOL/L — SIGNIFICANT CHANGE UP (ref 3.5–5.3)
POTASSIUM SERPL-SCNC: 3.9 MMOL/L — SIGNIFICANT CHANGE UP (ref 3.5–5.3)
PROT ?TM UR-MCNC: 33 MG/DL — HIGH (ref 0–12)
PROT SERPL-MCNC: 6.7 G/DL — SIGNIFICANT CHANGE UP (ref 6–8.3)
PROT UR-MCNC: 30 MG/DL
PROTHROM AB SERPL-ACNC: 11.6 SEC — SIGNIFICANT CHANGE UP (ref 10.5–13.4)
RBC # BLD: 3.91 M/UL — SIGNIFICANT CHANGE UP (ref 3.8–5.2)
RBC # FLD: 14.8 % — HIGH (ref 10.3–14.5)
SARS-COV-2 RNA SPEC QL NAA+PROBE: SIGNIFICANT CHANGE UP
SODIUM SERPL-SCNC: 140 MMOL/L — SIGNIFICANT CHANGE UP (ref 135–145)
SP GR SPEC: 1.01 — SIGNIFICANT CHANGE UP (ref 1.01–1.02)
URATE SERPL-MCNC: 6.5 MG/DL — SIGNIFICANT CHANGE UP (ref 2.5–7)
UROBILINOGEN FLD QL: NEGATIVE — SIGNIFICANT CHANGE UP
WBC # BLD: 8.44 K/UL — SIGNIFICANT CHANGE UP (ref 3.8–10.5)
WBC # FLD AUTO: 8.44 K/UL — SIGNIFICANT CHANGE UP (ref 3.8–10.5)

## 2022-08-04 RX ORDER — CHLORHEXIDINE GLUCONATE 213 G/1000ML
1 SOLUTION TOPICAL ONCE
Refills: 0 | Status: DISCONTINUED | OUTPATIENT
Start: 2022-08-04 | End: 2022-08-05

## 2022-08-04 RX ORDER — OXYTOCIN 10 UNIT/ML
2 VIAL (ML) INJECTION
Qty: 30 | Refills: 0 | Status: DISCONTINUED | OUTPATIENT
Start: 2022-08-04 | End: 2022-08-06

## 2022-08-04 RX ORDER — SODIUM CHLORIDE 9 MG/ML
1000 INJECTION, SOLUTION INTRAVENOUS
Refills: 0 | Status: DISCONTINUED | OUTPATIENT
Start: 2022-08-04 | End: 2022-08-05

## 2022-08-04 RX ADMIN — Medication 2 MILLIUNIT(S)/MIN: at 16:10

## 2022-08-04 RX ADMIN — Medication 2 MILLIUNIT(S)/MIN: at 23:30

## 2022-08-04 RX ADMIN — SODIUM CHLORIDE 125 MILLILITER(S): 9 INJECTION, SOLUTION INTRAVENOUS at 23:28

## 2022-08-04 RX ADMIN — SODIUM CHLORIDE 125 MILLILITER(S): 9 INJECTION, SOLUTION INTRAVENOUS at 10:10

## 2022-08-04 NOTE — PATIENT PROFILE OB - FUNCTIONAL ASSESSMENT - DAILY ACTIVITY 3.
Prescription approved per [facility] Refill Protocol.  Tamela Aldana RN     4 = No assist / stand by assistance

## 2022-08-04 NOTE — PATIENT PROFILE OB - CENTRAL VENOUS CATHETER
Attempted to reach Pt to schedule scan prior to appt.  Voice messages full, unable to leave message.     no

## 2022-08-04 NOTE — PATIENT PROFILE OB - FUNCTIONAL ASSESSMENT - DAILY ACTIVITY ASSESSMENT TYPE
Admission Complex Repair And Dorsal Nasal Flap Text: The defect edges were debeveled with a #15 scalpel blade.  The primary defect was closed partially with a complex linear closure.  Given the location of the remaining defect, shape of the defect and the proximity to free margins a dorsal nasal flap was deemed most appropriate for complete closure of the defect.  Using a sterile surgical marker, an appropriate flap was drawn incorporating the defect and placing the expected incisions within the relaxed skin tension lines where possible.    The area thus outlined was incised deep to adipose tissue with a #15 scalpel blade.  The skin margins were undermined to an appropriate distance in all directions utilizing iris scissors.

## 2022-08-05 ENCOUNTER — TRANSCRIPTION ENCOUNTER (OUTPATIENT)
Age: 27
End: 2022-08-05

## 2022-08-05 PROBLEM — O12.13 GESTATIONAL PROTEINURIA WITHOUT HYPERTENSION DURING PREGNANCY IN THIRD TRIMESTER: Status: RESOLVED | Noted: 2022-07-13 | Resolved: 2022-08-05

## 2022-08-05 PROBLEM — Z14.8 CARRIER OF FRAGILE X CHROMOSOME: Status: RESOLVED | Noted: 2022-02-02 | Resolved: 2022-08-05

## 2022-08-05 PROBLEM — O99.619 CONSTIPATION DURING PREGNANCY: Status: RESOLVED | Noted: 2022-02-04 | Resolved: 2022-08-05

## 2022-08-05 PROBLEM — Z34.93 PRENATAL CARE IN THIRD TRIMESTER: Status: RESOLVED | Noted: 2022-05-23 | Resolved: 2022-08-05

## 2022-08-05 PROBLEM — Z87.59 HISTORY OF POSTPARTUM DEPRESSION: Status: RESOLVED | Noted: 2022-02-28 | Resolved: 2022-08-05

## 2022-08-05 PROBLEM — J45.901 ASTHMA EXACERBATION: Status: RESOLVED | Noted: 2022-05-14 | Resolved: 2022-08-05

## 2022-08-05 PROBLEM — Z87.448 HISTORY OF HEMATURIA: Status: RESOLVED | Noted: 2022-02-02 | Resolved: 2022-08-05

## 2022-08-05 LAB
BASOPHILS # BLD AUTO: 0.05 K/UL — SIGNIFICANT CHANGE UP (ref 0–0.2)
BASOPHILS NFR BLD AUTO: 0.4 % — SIGNIFICANT CHANGE UP (ref 0–2)
EOSINOPHIL # BLD AUTO: 0.11 K/UL — SIGNIFICANT CHANGE UP (ref 0–0.5)
EOSINOPHIL NFR BLD AUTO: 0.9 % — SIGNIFICANT CHANGE UP (ref 0–6)
HCT VFR BLD CALC: 31.7 % — LOW (ref 34.5–45)
HGB BLD-MCNC: 10.1 G/DL — LOW (ref 11.5–15.5)
IMM GRANULOCYTES NFR BLD AUTO: 0.3 % — SIGNIFICANT CHANGE UP (ref 0–1.5)
LYMPHOCYTES # BLD AUTO: 1.96 K/UL — SIGNIFICANT CHANGE UP (ref 1–3.3)
LYMPHOCYTES # BLD AUTO: 15.7 % — SIGNIFICANT CHANGE UP (ref 13–44)
MCHC RBC-ENTMCNC: 27.2 PG — SIGNIFICANT CHANGE UP (ref 27–34)
MCHC RBC-ENTMCNC: 31.9 GM/DL — LOW (ref 32–36)
MCV RBC AUTO: 85.4 FL — SIGNIFICANT CHANGE UP (ref 80–100)
MONOCYTES # BLD AUTO: 0.77 K/UL — SIGNIFICANT CHANGE UP (ref 0–0.9)
MONOCYTES NFR BLD AUTO: 6.2 % — SIGNIFICANT CHANGE UP (ref 2–14)
NEUTROPHILS # BLD AUTO: 9.55 K/UL — HIGH (ref 1.8–7.4)
NEUTROPHILS NFR BLD AUTO: 76.5 % — SIGNIFICANT CHANGE UP (ref 43–77)
NRBC # BLD: 0 /100 WBCS — SIGNIFICANT CHANGE UP (ref 0–0)
PLATELET # BLD AUTO: 205 K/UL — SIGNIFICANT CHANGE UP (ref 150–400)
RBC # BLD: 3.71 M/UL — LOW (ref 3.8–5.2)
RBC # FLD: 14.9 % — HIGH (ref 10.3–14.5)
T PALLIDUM AB TITR SER: NEGATIVE — SIGNIFICANT CHANGE UP
WBC # BLD: 12.48 K/UL — HIGH (ref 3.8–10.5)
WBC # FLD AUTO: 12.48 K/UL — HIGH (ref 3.8–10.5)

## 2022-08-05 RX ORDER — OXYCODONE HYDROCHLORIDE 5 MG/1
5 TABLET ORAL ONCE
Refills: 0 | Status: DISCONTINUED | OUTPATIENT
Start: 2022-08-05 | End: 2022-08-06

## 2022-08-05 RX ORDER — AER TRAVELER 0.5 G/1
1 SOLUTION RECTAL; TOPICAL EVERY 4 HOURS
Refills: 0 | Status: DISCONTINUED | OUTPATIENT
Start: 2022-08-05 | End: 2022-08-06

## 2022-08-05 RX ORDER — LANOLIN
1 OINTMENT (GRAM) TOPICAL EVERY 6 HOURS
Refills: 0 | Status: DISCONTINUED | OUTPATIENT
Start: 2022-08-05 | End: 2022-08-06

## 2022-08-05 RX ORDER — ACETAMINOPHEN 500 MG
975 TABLET ORAL
Refills: 0 | Status: DISCONTINUED | OUTPATIENT
Start: 2022-08-05 | End: 2022-08-06

## 2022-08-05 RX ORDER — IBUPROFEN 200 MG
600 TABLET ORAL EVERY 6 HOURS
Refills: 0 | Status: DISCONTINUED | OUTPATIENT
Start: 2022-08-05 | End: 2022-08-06

## 2022-08-05 RX ORDER — SENNA PLUS 8.6 MG/1
2 TABLET ORAL AT BEDTIME
Refills: 0 | Status: DISCONTINUED | OUTPATIENT
Start: 2022-08-05 | End: 2022-08-06

## 2022-08-05 RX ORDER — SIMETHICONE 80 MG/1
80 TABLET, CHEWABLE ORAL EVERY 4 HOURS
Refills: 0 | Status: DISCONTINUED | OUTPATIENT
Start: 2022-08-05 | End: 2022-08-06

## 2022-08-05 RX ORDER — BENZOCAINE 10 %
1 GEL (GRAM) MUCOUS MEMBRANE EVERY 6 HOURS
Refills: 0 | Status: DISCONTINUED | OUTPATIENT
Start: 2022-08-05 | End: 2022-08-06

## 2022-08-05 RX ORDER — TETANUS TOXOID, REDUCED DIPHTHERIA TOXOID AND ACELLULAR PERTUSSIS VACCINE, ADSORBED 5; 2.5; 8; 8; 2.5 [IU]/.5ML; [IU]/.5ML; UG/.5ML; UG/.5ML; UG/.5ML
0.5 SUSPENSION INTRAMUSCULAR ONCE
Refills: 0 | Status: DISCONTINUED | OUTPATIENT
Start: 2022-08-05 | End: 2022-08-06

## 2022-08-05 RX ORDER — MAGNESIUM HYDROXIDE 400 MG/1
30 TABLET, CHEWABLE ORAL
Refills: 0 | Status: DISCONTINUED | OUTPATIENT
Start: 2022-08-05 | End: 2022-08-06

## 2022-08-05 RX ORDER — DIBUCAINE 1 %
1 OINTMENT (GRAM) RECTAL EVERY 6 HOURS
Refills: 0 | Status: DISCONTINUED | OUTPATIENT
Start: 2022-08-05 | End: 2022-08-06

## 2022-08-05 RX ORDER — HYDROCORTISONE 1 %
1 OINTMENT (GRAM) TOPICAL EVERY 6 HOURS
Refills: 0 | Status: DISCONTINUED | OUTPATIENT
Start: 2022-08-05 | End: 2022-08-06

## 2022-08-05 RX ORDER — FERROUS SULFATE 325(65) MG
325 TABLET ORAL DAILY
Refills: 0 | Status: DISCONTINUED | OUTPATIENT
Start: 2022-08-05 | End: 2022-08-06

## 2022-08-05 RX ORDER — KETOROLAC TROMETHAMINE 30 MG/ML
30 SYRINGE (ML) INJECTION ONCE
Refills: 0 | Status: DISCONTINUED | OUTPATIENT
Start: 2022-08-05 | End: 2022-08-06

## 2022-08-05 RX ORDER — DIPHENHYDRAMINE HCL 50 MG
25 CAPSULE ORAL EVERY 6 HOURS
Refills: 0 | Status: DISCONTINUED | OUTPATIENT
Start: 2022-08-05 | End: 2022-08-06

## 2022-08-05 RX ORDER — PRAMOXINE HYDROCHLORIDE 150 MG/15G
1 AEROSOL, FOAM RECTAL EVERY 4 HOURS
Refills: 0 | Status: DISCONTINUED | OUTPATIENT
Start: 2022-08-05 | End: 2022-08-06

## 2022-08-05 RX ORDER — SODIUM CHLORIDE 9 MG/ML
3 INJECTION INTRAMUSCULAR; INTRAVENOUS; SUBCUTANEOUS EVERY 8 HOURS
Refills: 0 | Status: DISCONTINUED | OUTPATIENT
Start: 2022-08-05 | End: 2022-08-06

## 2022-08-05 RX ORDER — OXYTOCIN 10 UNIT/ML
333.33 VIAL (ML) INJECTION
Qty: 20 | Refills: 0 | Status: DISCONTINUED | OUTPATIENT
Start: 2022-08-05 | End: 2022-08-06

## 2022-08-05 RX ADMIN — SODIUM CHLORIDE 3 MILLILITER(S): 9 INJECTION INTRAMUSCULAR; INTRAVENOUS; SUBCUTANEOUS at 14:00

## 2022-08-05 RX ADMIN — SODIUM CHLORIDE 3 MILLILITER(S): 9 INJECTION INTRAMUSCULAR; INTRAVENOUS; SUBCUTANEOUS at 22:03

## 2022-08-05 RX ADMIN — SODIUM CHLORIDE 3 MILLILITER(S): 9 INJECTION INTRAMUSCULAR; INTRAVENOUS; SUBCUTANEOUS at 06:33

## 2022-08-05 RX ADMIN — Medication 1000 MILLIUNIT(S)/MIN: at 03:23

## 2022-08-05 RX ADMIN — Medication 325 MILLIGRAM(S): at 12:56

## 2022-08-05 RX ADMIN — Medication 1 TABLET(S): at 12:56

## 2022-08-05 NOTE — DISCHARGE NOTE OB - PRINCIPAL DIAGNOSIS
(normal spontaneous vaginal delivery) Aftercare following left THR 11/30/20    pain meds oxycodone and dilaudid. Monitor for respiratory depression and lethargy.  PT/OT.  DVT prophylaxis.  DVT ppx: [ ]ASA81 [ ] HMV533 [ ] Lovenox [ ] Coumadin   [ ] Eliquis [  ] Heparin  Dispo:     Home [x ]     Acute Rehab [ ]     [facility] [ ]     TBD [ ]    PAF/Dyslipidemia/valvular heart disease  Eliquis    Post op anemia due to acute blood loss/leucocytosis likly reactive  asymptomatics.  trend cbc prn.    Plan of care was discuss with patient, all questions were answered, seems understand and in agreement.  Patient is medically cleared for discharge pending PT/Ortho clearnce.

## 2022-08-05 NOTE — LACTATION INITIAL EVALUATION - LACTATION INTERVENTIONS
Mom chooses to feed both breast and formula. Explored reason and discussed risks of supplementing while breastfeeding without medical reason. Mom still chooses to supplement with formula. Explained risks of using artificial nipples and discussed options for using alternative feeding methods.  Reviewed positioning and latch techniques and care of sore nipples; Safe formula preparation and feeding discussed. Breastfeeding on cue 8-12X/24 hours with diaper count to assess for adequate intake, safe skin to skin and rooming-in encouraged./initiate/review hand expression/initiate/review techniques for position and latch/reviewed components of an effective feeding and at least 8 effective feedings per day required/reviewed importance of monitoring infant diapers, the breastfeeding log, and minimum output each day/reviewed risks of artificial nipples/reviewed benefits and recommendations for rooming in/reviewed feeding on demand/by cue at least 8 times a day

## 2022-08-05 NOTE — DISCHARGE NOTE OB - MATERIALS PROVIDED
Vaccinations/[facility]  Screening Program/  Immunization Record/Breastfeeding Log/Bottle Feeding Log/Breastfeeding Mother’s Support Group Information/Guide to Postpartum Care/[facility] Hearing Screen Program/Back To Sleep Handout/Shaken Baby Prevention Handout/Breastfeeding Guide and Packet/Birth Certificate Instructions/Discharge Medication Information for Patients and Families Pocket Guide/Letter of Medical Neccessity/Prescription for Breast Pump/MMR Vaccination (VIS Pub Date: 2012)

## 2022-08-05 NOTE — DISCHARGE NOTE OB - CARE PROVIDER_API CALL
Negar Lopez)  Obstetrics and Gynecology  [address] [place] [address]  Phone: [phone]  Fax: [phone]  Follow Up Time: 1 month

## 2022-08-05 NOTE — DISCHARGE NOTE OB - HOSPITAL COURSE
25yo with pmh of chronic anemia, asthma, postpartum depression hematuria with proteinuria followed by urology miol for increasing uric acid and proteinuria s/p  @ 39wks; uncomplicated delivery and postpartum course; pt stable   social work consulted for h/o postpartum depression  27yo with pmh of chronic anemia, asthma, postpartum depression hematuria with proteinuria followed by urology miol for increasing uric acid and proteinuria s/p  @ 39wks;  will follow up with nephrology outpatient  uncomplicated delivery and postpartum course; pt stable   social work consulted for h/o postpartum depression

## 2022-08-05 NOTE — DISCHARGE NOTE OB - PLAN OF CARE
No sex, no pushing, no heavy lifting, no strenuous activity, Nothing per vagina x  6 weeks - no sex, tampons, douching, tub baths, etc. Continue breastfeeding.  Motrin as needed for pain. Follow up in office in 5-6 weeks for post partum check up. Please call for appt. follow up with urologist take iron, folic acid, vitamin C, and prenatal vitamins. eat iron fortified food. Please take iron tablets three times daily. Return if any dizziness, shortness of breath, palpitations, chest pain or any other acute symptoms. Please have caution when holding baby to prevent any falls or dizziness with baby in arms.

## 2022-08-05 NOTE — DISCHARGE NOTE OB - PATIENT PORTAL LINK FT
You can access the FollowMyHealth Patient Portal offered by [facility] by registering at the following website: [email or web address]. By joining [employer]’s FollowMyHealth portal, you will also be able to view your health information using other applications (apps) compatible with our system.

## 2022-08-05 NOTE — DISCHARGE NOTE OB - CARE PLAN
1 Principal Discharge DX:	 (normal spontaneous vaginal delivery)  Assessment and plan of treatment:	No sex, no pushing, no heavy lifting, no strenuous activity, Nothing per vagina x  6 weeks - no sex, tampons, douching, tub baths, etc. Continue breastfeeding.  Motrin as needed for pain. Follow up in office in 5-6 weeks for post partum check up. Please call for appt.  Secondary Diagnosis:	Chronic anemia  Assessment and plan of treatment:	take iron, folic acid, vitamin C, and prenatal vitamins. eat iron fortified food. Please take iron tablets three times daily. Return if any dizziness, shortness of breath, palpitations, chest pain or any other acute symptoms. Please have caution when holding baby to prevent any falls or dizziness with baby in arms.  Secondary Diagnosis:	Hematuria  Assessment and plan of treatment:	follow up with urologist  Secondary Diagnosis:	Hematuria with proteinuria  Assessment and plan of treatment:	follow up with urologist

## 2022-08-05 NOTE — DISCHARGE NOTE OB - NS MD DC FALL RISK RISK
For information on Fall & Injury Prevention, visit: [email or web address] OR  [email or web address] OR  https://www.cdc.gov/steadi/patient.html

## 2022-08-05 NOTE — DISCHARGE NOTE OB - MEDICATION SUMMARY - MEDICATIONS TO TAKE
I will START or STAY ON the medications listed below when I get home from the hospital:    ibuprofen 600 mg oral tablet  -- 1 tab(s) by mouth every 6 hours, As needed, Moderate Pain (4 - 6)  -- Indication: For for pain    acetaminophen 500 mg oral tablet  -- 2 tab(s) by mouth every 6 hours   -- This product contains acetaminophen.  Do not use  with any other product containing acetaminophen to prevent possible liver damage.    -- Indication: For for pain    benzocaine 20% topical spray  -- Apply on skin to affected area every 6 hours   -- For external use only.    -- Indication: For perineal pain    Prenatal Multivitamins with Folic Acid 1 mg oral tablet  -- 1 tab(s) by mouth once a day  -- Indication: For pp supplement    Colace 2-in-1 50 mg-8.6 mg oral tablet  -- 1 tab(s) by mouth 2 times a day   -- Medication should be taken with plenty of water.    -- Indication: For for bowel movement

## 2022-08-06 VITALS
HEART RATE: 79 BPM | DIASTOLIC BLOOD PRESSURE: 62 MMHG | TEMPERATURE: 98 F | RESPIRATION RATE: 17 BRPM | OXYGEN SATURATION: 97 % | SYSTOLIC BLOOD PRESSURE: 98 MMHG

## 2022-08-06 RX ORDER — ACETAMINOPHEN 500 MG
2 TABLET ORAL
Qty: 56 | Refills: 0
Start: 2022-08-06 | End: 2022-08-12

## 2022-08-06 RX ORDER — SENNOSIDES/DOCUSATE SODIUM 8.6MG-50MG
1 TABLET ORAL
Qty: 20 | Refills: 0
Start: 2022-08-06 | End: 2022-08-15

## 2022-08-06 RX ORDER — IBUPROFEN 200 MG
1 TABLET ORAL
Qty: 20 | Refills: 2
Start: 2022-08-06 | End: 2022-08-20

## 2022-08-06 RX ORDER — BENZOCAINE 10 %
1 GEL (GRAM) MUCOUS MEMBRANE
Qty: 1 | Refills: 0
Start: 2022-08-06

## 2022-08-06 RX ADMIN — SODIUM CHLORIDE 3 MILLILITER(S): 9 INJECTION INTRAMUSCULAR; INTRAVENOUS; SUBCUTANEOUS at 06:03

## 2022-08-06 RX ADMIN — Medication 1 TABLET(S): at 12:22

## 2022-08-06 RX ADMIN — Medication 325 MILLIGRAM(S): at 12:22

## 2022-08-06 NOTE — PROGRESS NOTE ADULT - SUBJECTIVE AND OBJECTIVE BOX
Patient seen at bedside resting comfortably offers no current complaints. Ambulating and voiding without difficulty. Passing flatus and tolerating regular diet.  Both breast/bottle feeding. Denies HA, CP, SOB, N/V/D, dizziness, palpitations,  worsening vaginal bleeding, or any other concerns.      Vital Signs Last 24 Hrs  T(C): 36.6 (06 Aug 2022 05:02), Max: 36.9 (05 Aug 2022 14:00)  T(F): 97.9 (06 Aug 2022 05:02), Max: 98.4 (05 Aug 2022 14:00)  HR: 79 (06 Aug 2022 05:02) (79 - 97)  BP: 98/62 (06 Aug 2022 05:02) (95/58 - 101/64)  RR: 17 (06 Aug 2022 05:02) (16 - 18)  SpO2: 97% (06 Aug 2022 05:02) (97% - 98%)    Parameters below as of 06 Aug 2022 05:02  Patient On (Oxygen Delivery Method): room air        Physical Exam:     Gen: A&Ox 3, NAD  Chest: CTA B/L  Cardiac: S1,S2; RRR  Breast: Soft, nontender, nonengorged  Abdomen: +BS, Soft, nontender, ND; Fundus firm below umbilicus  Gyn: mod lochia  Ext: Nontender, DTRS 2+, no worsening edema                          10.1   12.48 )-----------( 205      ( 05 Aug 2022 17:40 )             31.7       A/P:  PPD#1 s/p  @39wks  -Continue pain management  -social work consulted for h/o PP depression  -patient seen by nephrology will f/u outpatient  -Plan for discharge w/ social work clearance  -d/w dr Hensley

## 2022-08-11 ENCOUNTER — NON-APPOINTMENT (OUTPATIENT)
Age: 27
End: 2022-08-11

## 2022-08-11 PROCEDURE — 86901 BLOOD TYPING SEROLOGIC RH(D): CPT

## 2022-08-11 PROCEDURE — 84156 ASSAY OF PROTEIN URINE: CPT

## 2022-08-11 PROCEDURE — 36415 COLL VENOUS BLD VENIPUNCTURE: CPT

## 2022-08-11 PROCEDURE — 80053 COMPREHEN METABOLIC PANEL: CPT

## 2022-08-11 PROCEDURE — 59050 FETAL MONITOR W/REPORT: CPT

## 2022-08-11 PROCEDURE — 85610 PROTHROMBIN TIME: CPT

## 2022-08-11 PROCEDURE — 83615 LACTATE (LD) (LDH) ENZYME: CPT

## 2022-08-11 PROCEDURE — 81001 URINALYSIS AUTO W/SCOPE: CPT

## 2022-08-11 PROCEDURE — 59025 FETAL NON-STRESS TEST: CPT

## 2022-08-11 PROCEDURE — 85730 THROMBOPLASTIN TIME PARTIAL: CPT

## 2022-08-11 PROCEDURE — 84550 ASSAY OF BLOOD/URIC ACID: CPT

## 2022-08-11 PROCEDURE — 86850 RBC ANTIBODY SCREEN: CPT

## 2022-08-11 PROCEDURE — 86900 BLOOD TYPING SEROLOGIC ABO: CPT

## 2022-08-11 PROCEDURE — 86780 TREPONEMA PALLIDUM: CPT

## 2022-08-11 PROCEDURE — 87635 SARS-COV-2 COVID-19 AMP PRB: CPT

## 2022-08-11 PROCEDURE — 85025 COMPLETE CBC W/AUTO DIFF WBC: CPT

## 2022-08-11 PROCEDURE — G0463: CPT

## 2022-08-11 PROCEDURE — 85384 FIBRINOGEN ACTIVITY: CPT

## 2022-08-11 PROCEDURE — 82570 ASSAY OF URINE CREATININE: CPT

## 2022-08-16 NOTE — ED ADULT TRIAGE NOTE - AS TEMP SITE
Chief Complaint   Patient presents with     Covid Concern     Long symptoms      Pt reports congestion, right ear feels blocked with some clicking when eating from the jaw     
oral

## 2022-09-16 ENCOUNTER — OUTPATIENT (OUTPATIENT)
Dept: OUTPATIENT SERVICES | Facility: HOSPITAL | Age: 27
LOS: 1 days | End: 2022-09-16
Payer: MEDICAID

## 2022-09-16 ENCOUNTER — APPOINTMENT (OUTPATIENT)
Dept: OBGYN | Facility: CLINIC | Age: 27
End: 2022-09-16

## 2022-09-16 VITALS
WEIGHT: 180 LBS | HEIGHT: 62 IN | BODY MASS INDEX: 33.13 KG/M2 | SYSTOLIC BLOOD PRESSURE: 104 MMHG | DIASTOLIC BLOOD PRESSURE: 64 MMHG | OXYGEN SATURATION: 98 % | TEMPERATURE: 97.9 F | HEART RATE: 81 BPM

## 2022-09-16 DIAGNOSIS — Z98.89 OTHER SPECIFIED POSTPROCEDURAL STATES: Chronic | ICD-10-CM

## 2022-09-16 DIAGNOSIS — Z34.00 ENCOUNTER FOR SUPERVISION OF NORMAL FIRST PREGNANCY, UNSPECIFIED TRIMESTER: ICD-10-CM

## 2022-09-16 PROCEDURE — G0463: CPT

## 2022-09-16 PROCEDURE — 99213 OFFICE O/P EST LOW 20 MIN: CPT

## 2022-09-16 PROCEDURE — 36415 COLL VENOUS BLD VENIPUNCTURE: CPT

## 2022-09-16 NOTE — HISTORY OF PRESENT ILLNESS
[Complications:___] : complications include: [unfilled] [Last Pap Date: ___] : Last Pap Date: [unfilled] [Delivery Date: ___] : on [unfilled] [] : delivered by vaginal delivery [Female] : Delivery History: baby girl [Wt. ___] : weighing [unfilled] [Rhogam] : Rhogam was not administered [Rubella Vaccine] : Rubella vaccine was not administered [Pertussis Vaccine] : Pertussis vaccine was not administered [BTL] : no tubal ligation [Resumed [place]] : has resumed intercourse [Intended Contraception] : Intended Contraception: [Back to Normal] : is back to normal in size [None] : no vaginal bleeding [Normal] : the vagina was normal [Cervix Sample Taken] : cervical sample not taken for a Pap smear [Examination Of The Breasts] : breasts are normal [Doing Well] : is doing well [No Sign of Infection] : is showing no signs of infection [FreeTextEntry8] : EPDS~0 / Sexually active, didn't use condoms / Breastfeeding [de-identified] : Postpartum exam / Contraception counseling [de-identified] : POCT - negative today - repeat in one week, if Negative will administer Depo / PNV continued [Breastfeeding] : not currently nursing [BF with Difficulty] : nursing without difficulty [Resumed Menses] : has not resumed her menses

## 2022-09-19 DIAGNOSIS — Z3A.25 25 WEEKS GESTATION OF PREGNANCY: ICD-10-CM

## 2022-09-19 DIAGNOSIS — Z87.59 PERSONAL HISTORY OF OTHER COMPLICATIONS OF PREGNANCY, CHILDBIRTH AND THE PUERPERIUM: ICD-10-CM

## 2022-09-19 DIAGNOSIS — Z34.92 ENCOUNTER FOR SUPERVISION OF NORMAL PREGNANCY, UNSPECIFIED, SECOND TRIMESTER: ICD-10-CM

## 2022-09-23 ENCOUNTER — OUTPATIENT (OUTPATIENT)
Dept: OUTPATIENT SERVICES | Facility: HOSPITAL | Age: 27
LOS: 1 days | End: 2022-09-23
Payer: MEDICAID

## 2022-09-23 ENCOUNTER — NON-APPOINTMENT (OUTPATIENT)
Age: 27
End: 2022-09-23

## 2022-09-23 ENCOUNTER — APPOINTMENT (OUTPATIENT)
Dept: OBGYN | Facility: CLINIC | Age: 27
End: 2022-09-23

## 2022-09-23 DIAGNOSIS — Z12.39 ENCOUNTER FOR OTHER SCREENING FOR MALIGNANT NEOPLASM OF BREAST: ICD-10-CM

## 2022-09-23 DIAGNOSIS — Z00.00 ENCOUNTER FOR GENERAL ADULT MEDICAL EXAMINATION WITHOUT ABNORMAL FINDINGS: ICD-10-CM

## 2022-09-23 DIAGNOSIS — Z98.89 OTHER SPECIFIED POSTPROCEDURAL STATES: Chronic | ICD-10-CM

## 2022-09-23 PROCEDURE — G0463: CPT

## 2022-09-23 PROCEDURE — 81025 URINE PREGNANCY TEST: CPT

## 2022-09-23 PROCEDURE — 96372 THER/PROPH/DIAG INJ SC/IM: CPT

## 2022-09-23 PROCEDURE — ZZZZZ: CPT

## 2022-09-23 NOTE — HISTORY OF PRESENT ILLNESS
[FreeTextEntry1] : Gyn F/U for Depo administration: Has not had unprotected sex since postpartum visit as advised. \par \par POCT x 2 - Negative 7 days apart

## 2022-09-26 DIAGNOSIS — Z30.9 ENCOUNTER FOR CONTRACEPTIVE MANAGEMENT, UNSPECIFIED: ICD-10-CM

## 2022-09-26 DIAGNOSIS — Z32.02 ENCOUNTER FOR PREGNANCY TEST, RESULT NEGATIVE: ICD-10-CM

## 2022-09-26 DIAGNOSIS — Z30.09 ENCOUNTER FOR OTHER GENERAL COUNSELING AND ADVICE ON CONTRACEPTION: ICD-10-CM

## 2022-09-26 DIAGNOSIS — Z30.42 ENCOUNTER FOR SURVEILLANCE OF INJECTABLE CONTRACEPTIVE: ICD-10-CM

## 2022-09-26 DIAGNOSIS — Z30.013 ENCOUNTER FOR INITIAL PRESCRIPTION OF INJECTABLE CONTRACEPTIVE: ICD-10-CM

## 2022-10-25 ENCOUNTER — OUTPATIENT (OUTPATIENT)
Dept: OUTPATIENT SERVICES | Facility: HOSPITAL | Age: 27
LOS: 1 days | End: 2022-10-25
Payer: MEDICAID

## 2022-10-25 ENCOUNTER — APPOINTMENT (OUTPATIENT)
Dept: OBGYN | Facility: CLINIC | Age: 27
End: 2022-10-25

## 2022-10-25 VITALS
HEIGHT: 62 IN | TEMPERATURE: 98.4 F | BODY MASS INDEX: 33.31 KG/M2 | DIASTOLIC BLOOD PRESSURE: 71 MMHG | HEART RATE: 85 BPM | SYSTOLIC BLOOD PRESSURE: 105 MMHG | WEIGHT: 181 LBS | OXYGEN SATURATION: 97 %

## 2022-10-25 DIAGNOSIS — Z98.89 OTHER SPECIFIED POSTPROCEDURAL STATES: Chronic | ICD-10-CM

## 2022-10-25 DIAGNOSIS — Z32.02 ENCOUNTER FOR PREGNANCY TEST, RESULT NEGATIVE: ICD-10-CM

## 2022-10-25 DIAGNOSIS — Z30.013 ENCOUNTER FOR INITIAL PRESCRIPTION OF INJECTABLE CONTRACEPTIVE: ICD-10-CM

## 2022-10-25 DIAGNOSIS — Z00.00 ENCOUNTER FOR GENERAL ADULT MEDICAL EXAMINATION WITHOUT ABNORMAL FINDINGS: ICD-10-CM

## 2022-10-25 PROCEDURE — 99214 OFFICE O/P EST MOD 30 MIN: CPT

## 2022-10-25 PROCEDURE — G0463: CPT

## 2022-10-25 NOTE — HISTORY OF PRESENT ILLNESS
[Patient reported PAP Smear was normal] : Patient reported PAP Smear was normal [FreeTextEntry1] : Patient taking Depo for contraception, initial dose administered (09/23/22) complaining of heavy, painful period for the first time.  Used to take Depo previously, denies experiencing these symptoms.  \par \par LMP(10/21/22)\par \par  [PapSmeardate] : 1/14/2020 [TextBox_31] : WNL [LMPDate] : 10/21/2022 [No] : Patient does not have concerns regarding sex [Currently Active] : currently active [Men] : men [Vaginal] : vaginal [FreeTextEntry3] : Jomar

## 2022-10-25 NOTE — PHYSICAL EXAM
[Chaperone Present] : A chaperone was present in the examining room during all aspects of the physical examination [Appropriately responsive] : appropriately responsive [Alert] : alert [No Acute Distress] : no acute distress [Oriented x3] : oriented x3 [Labia Majora] : normal [Labia Minora] : normal [Moderate] : There was moderate vaginal bleeding [Normal] : normal [FreeTextEntry4] : No blood clots, bleeding consistent with current Depo and day ##5 of period - pt reassured

## 2022-10-26 DIAGNOSIS — Z30.09 ENCOUNTER FOR OTHER GENERAL COUNSELING AND ADVICE ON CONTRACEPTION: ICD-10-CM

## 2022-10-26 DIAGNOSIS — N92.0 EXCESSIVE AND FREQUENT MENSTRUATION WITH REGULAR CYCLE: ICD-10-CM

## 2022-10-26 DIAGNOSIS — N94.6 DYSMENORRHEA, UNSPECIFIED: ICD-10-CM

## 2022-12-20 ENCOUNTER — APPOINTMENT (OUTPATIENT)
Dept: OBGYN | Facility: CLINIC | Age: 27
End: 2022-12-20
Payer: MEDICAID

## 2022-12-20 ENCOUNTER — NON-APPOINTMENT (OUTPATIENT)
Age: 27
End: 2022-12-20

## 2022-12-20 ENCOUNTER — OUTPATIENT (OUTPATIENT)
Dept: OUTPATIENT SERVICES | Facility: HOSPITAL | Age: 27
LOS: 1 days | End: 2022-12-20
Payer: MEDICAID

## 2022-12-20 DIAGNOSIS — Z98.89 OTHER SPECIFIED POSTPROCEDURAL STATES: Chronic | ICD-10-CM

## 2022-12-20 DIAGNOSIS — Z00.00 ENCOUNTER FOR GENERAL ADULT MEDICAL EXAMINATION WITHOUT ABNORMAL FINDINGS: ICD-10-CM

## 2022-12-20 PROCEDURE — 96372 THER/PROPH/DIAG INJ SC/IM: CPT

## 2022-12-20 PROCEDURE — G0463: CPT

## 2022-12-20 PROCEDURE — 81025 URINE PREGNANCY TEST: CPT

## 2022-12-20 PROCEDURE — ZZZZZ: CPT

## 2022-12-21 DIAGNOSIS — Z30.9 ENCOUNTER FOR CONTRACEPTIVE MANAGEMENT, UNSPECIFIED: ICD-10-CM

## 2022-12-21 DIAGNOSIS — Z30.42 ENCOUNTER FOR SURVEILLANCE OF INJECTABLE CONTRACEPTIVE: ICD-10-CM

## 2023-03-21 ENCOUNTER — APPOINTMENT (OUTPATIENT)
Dept: OBGYN | Facility: CLINIC | Age: 28
End: 2023-03-21
Payer: MEDICAID

## 2023-03-21 ENCOUNTER — OUTPATIENT (OUTPATIENT)
Dept: OUTPATIENT SERVICES | Facility: HOSPITAL | Age: 28
LOS: 1 days | End: 2023-03-21
Payer: MEDICAID

## 2023-03-21 VITALS
OXYGEN SATURATION: 97 % | HEIGHT: 62 IN | RESPIRATION RATE: 16 BRPM | DIASTOLIC BLOOD PRESSURE: 67 MMHG | BODY MASS INDEX: 33.49 KG/M2 | SYSTOLIC BLOOD PRESSURE: 99 MMHG | HEART RATE: 75 BPM | WEIGHT: 182 LBS | TEMPERATURE: 98.8 F

## 2023-03-21 DIAGNOSIS — Z98.89 OTHER SPECIFIED POSTPROCEDURAL STATES: Chronic | ICD-10-CM

## 2023-03-21 DIAGNOSIS — Z00.00 ENCOUNTER FOR GENERAL ADULT MEDICAL EXAMINATION WITHOUT ABNORMAL FINDINGS: ICD-10-CM

## 2023-03-21 PROCEDURE — G0463: CPT

## 2023-03-21 PROCEDURE — 99213 OFFICE O/P EST LOW 20 MIN: CPT

## 2023-03-21 RX ORDER — ALBUTEROL SULFATE 90 UG/1
108 (90 BASE) INHALANT RESPIRATORY (INHALATION)
Qty: 1 | Refills: 3 | Status: COMPLETED | COMMUNITY
Start: 2022-05-13 | End: 2023-03-21

## 2023-03-21 RX ORDER — BUDESONIDE AND FORMOTEROL FUMARATE DIHYDRATE 160; 4.5 UG/1; UG/1
160-4.5 AEROSOL RESPIRATORY (INHALATION) TWICE DAILY
Qty: 1 | Refills: 1 | Status: COMPLETED | COMMUNITY
Start: 2022-05-13 | End: 2023-03-21

## 2023-03-21 RX ORDER — PSYLLIUM SEED
48.57 PACKET (EA) ORAL
Qty: 1 | Refills: 6 | Status: COMPLETED | COMMUNITY
Start: 2022-02-04 | End: 2023-03-21

## 2023-03-21 RX ORDER — IRON POLYSACCHARIDE COMPLEX 150 MG
150 CAPSULE ORAL
Qty: 30 | Refills: 9 | Status: COMPLETED | COMMUNITY
Start: 2022-02-28 | End: 2023-03-21

## 2023-03-21 RX ORDER — CALCIUM CARBONATE 300MG(750)
0.4-32.5 TABLET,CHEWABLE ORAL
Qty: 30 | Refills: 11 | Status: COMPLETED | COMMUNITY
Start: 2021-12-15 | End: 2023-03-21

## 2023-03-21 RX ORDER — CALCIUM CARBONATE/VITAMIN D3 600 MG-20
600-800 TABLET ORAL
Qty: 60 | Refills: 11 | Status: COMPLETED | COMMUNITY
Start: 2022-09-16 | End: 2023-03-21

## 2023-03-21 RX ORDER — MEDROXYPROGESTERONE ACETATE 150 MG/ML
150 INJECTION, SUSPENSION INTRAMUSCULAR
Qty: 1 | Refills: 3 | Status: COMPLETED | COMMUNITY
Start: 2022-09-16 | End: 2023-03-21

## 2023-03-21 RX ORDER — DOCUSATE SODIUM - SENNOSIDES 50; 8.6 MG/1; MG/1
8.6-5 TABLET, FILM COATED ORAL DAILY
Qty: 60 | Refills: 9 | Status: COMPLETED | COMMUNITY
Start: 2022-02-04 | End: 2023-03-21

## 2023-03-21 RX ORDER — PREDNISONE 20 MG/1
20 TABLET ORAL DAILY
Qty: 10 | Refills: 0 | Status: COMPLETED | COMMUNITY
Start: 2022-05-13 | End: 2023-03-21

## 2023-03-21 NOTE — HISTORY OF PRESENT ILLNESS
[FreeTextEntry1] : Requesting change of contraception from Depo to OCP - doesn't like side effects of Depo currently.  Depo last administered (12/2020), no period since delivery secondary to Depo. [TextBox_4] : wants to change birth control to pills [No] : Patient does not have concerns regarding sex [Currently Active] : currently active [Men] : men [Vaginal] : vaginal

## 2023-03-22 DIAGNOSIS — N92.0 EXCESSIVE AND FREQUENT MENSTRUATION WITH REGULAR CYCLE: ICD-10-CM

## 2023-03-22 DIAGNOSIS — Z30.09 ENCOUNTER FOR OTHER GENERAL COUNSELING AND ADVICE ON CONTRACEPTION: ICD-10-CM

## 2023-03-22 DIAGNOSIS — Z30.011 ENCOUNTER FOR INITIAL PRESCRIPTION OF CONTRACEPTIVE PILLS: ICD-10-CM

## 2023-03-22 DIAGNOSIS — N94.6 DYSMENORRHEA, UNSPECIFIED: ICD-10-CM

## 2023-05-11 ENCOUNTER — APPOINTMENT (OUTPATIENT)
Dept: UROLOGY | Facility: CLINIC | Age: 28
End: 2023-05-11
Payer: MEDICAID

## 2023-05-11 VITALS
SYSTOLIC BLOOD PRESSURE: 99 MMHG | HEIGHT: 62 IN | BODY MASS INDEX: 33.49 KG/M2 | OXYGEN SATURATION: 98 % | WEIGHT: 182 LBS | DIASTOLIC BLOOD PRESSURE: 65 MMHG | TEMPERATURE: 100.3 F | HEART RATE: 129 BPM

## 2023-05-11 PROCEDURE — 99214 OFFICE O/P EST MOD 30 MIN: CPT

## 2023-05-11 PROCEDURE — 76775 US EXAM ABDO BACK WALL LIM: CPT

## 2023-05-12 ENCOUNTER — NON-APPOINTMENT (OUTPATIENT)
Age: 28
End: 2023-05-12

## 2023-05-12 NOTE — ASSESSMENT
[FreeTextEntry1] : sono images reviewed \par no stones or hydro \par send ucx \par swith to bactrim \par advise to go to er for fever\par

## 2023-05-12 NOTE — HISTORY OF PRESENT ILLNESS
[FreeTextEntry1] : cc uti \par 28 yo fem c/o uti \par has had uti in past \par c/o frequency urgency dysuria \par started bactrim 1 day \par now flank pain and low grade fever\par has had pyelo in past requiring hosp/iv abx \par not currently sexually active

## 2023-05-13 ENCOUNTER — NON-APPOINTMENT (OUTPATIENT)
Age: 28
End: 2023-05-13

## 2023-05-15 LAB — BACTERIA UR CULT: ABNORMAL

## 2023-05-19 ENCOUNTER — OUTPATIENT (OUTPATIENT)
Dept: OUTPATIENT SERVICES | Facility: HOSPITAL | Age: 28
LOS: 1 days | End: 2023-05-19
Payer: MEDICAID

## 2023-05-19 ENCOUNTER — APPOINTMENT (OUTPATIENT)
Dept: OBGYN | Facility: CLINIC | Age: 28
End: 2023-05-19
Payer: MEDICAID

## 2023-05-19 VITALS
HEART RATE: 64 BPM | OXYGEN SATURATION: 98 % | TEMPERATURE: 98.1 F | DIASTOLIC BLOOD PRESSURE: 70 MMHG | SYSTOLIC BLOOD PRESSURE: 113 MMHG | BODY MASS INDEX: 32.39 KG/M2 | HEIGHT: 62 IN | WEIGHT: 176 LBS

## 2023-05-19 DIAGNOSIS — Z30.011 ENCOUNTER FOR INITIAL PRESCRIPTION OF CONTRACEPTIVE PILLS: ICD-10-CM

## 2023-05-19 DIAGNOSIS — Z87.42 PERSONAL HISTORY OF OTHER DISEASES OF THE FEMALE GENITAL TRACT: ICD-10-CM

## 2023-05-19 DIAGNOSIS — Z00.00 ENCOUNTER FOR GENERAL ADULT MEDICAL EXAMINATION WITHOUT ABNORMAL FINDINGS: ICD-10-CM

## 2023-05-19 DIAGNOSIS — N39.0 URINARY TRACT INFECTION, SITE NOT SPECIFIED: ICD-10-CM

## 2023-05-19 DIAGNOSIS — Z98.89 OTHER SPECIFIED POSTPROCEDURAL STATES: Chronic | ICD-10-CM

## 2023-05-19 DIAGNOSIS — A49.9 URINARY TRACT INFECTION, SITE NOT SPECIFIED: ICD-10-CM

## 2023-05-19 DIAGNOSIS — Z87.898 PERSONAL HISTORY OF OTHER SPECIFIED CONDITIONS: ICD-10-CM

## 2023-05-19 PROCEDURE — 87086 URINE CULTURE/COLONY COUNT: CPT

## 2023-05-19 PROCEDURE — G0463: CPT

## 2023-05-19 PROCEDURE — 99214 OFFICE O/P EST MOD 30 MIN: CPT

## 2023-05-19 RX ORDER — CIPROFLOXACIN HYDROCHLORIDE 500 MG/1
500 TABLET, FILM COATED ORAL TWICE DAILY
Qty: 14 | Refills: 0 | Status: COMPLETED | COMMUNITY
Start: 2023-05-11 | End: 2023-05-19

## 2023-05-19 RX ORDER — SULFAMETHOXAZOLE AND TRIMETHOPRIM 800; 160 MG/1; MG/1
800-160 TABLET ORAL
Qty: 10 | Refills: 0 | Status: COMPLETED | COMMUNITY
Start: 2023-05-10 | End: 2023-05-19

## 2023-05-19 NOTE — HISTORY OF PRESENT ILLNESS
[Patient reported PAP Smear was normal] : Patient reported PAP Smear was normal [FreeTextEntry1] : F/U for oral  contraception management:\par \par Apri - LMP(05/16/23) no complaints, requesting refill\par \par Hx/o Pyelonephritis(05/11/23) - Bladder hygiene reviewed, completed treatment, asymptomatic currently, Urine Culture sent today for PIETRO [PapSmeardate] : 1/14/2020 [TextBox_31] : WNL [LMPDate] : 5/16/2023 [No] : Patient does not have concerns regarding sex [Currently Active] : currently active [Men] : men [Vaginal] : vaginal [FreeTextEntry3] : OCP

## 2023-05-23 DIAGNOSIS — Z30.09 ENCOUNTER FOR OTHER GENERAL COUNSELING AND ADVICE ON CONTRACEPTION: ICD-10-CM

## 2023-05-23 DIAGNOSIS — N39.0 URINARY TRACT INFECTION, SITE NOT SPECIFIED: ICD-10-CM

## 2023-05-23 DIAGNOSIS — Z30.011 ENCOUNTER FOR INITIAL PRESCRIPTION OF CONTRACEPTIVE PILLS: ICD-10-CM

## 2023-05-23 DIAGNOSIS — N92.0 EXCESSIVE AND FREQUENT MENSTRUATION WITH REGULAR CYCLE: ICD-10-CM

## 2023-05-23 LAB — BACTERIA UR CULT: NORMAL

## 2023-08-16 ENCOUNTER — APPOINTMENT (OUTPATIENT)
Dept: OBGYN | Facility: CLINIC | Age: 28
End: 2023-08-16

## 2023-08-16 VITALS
OXYGEN SATURATION: 98 % | DIASTOLIC BLOOD PRESSURE: 69 MMHG | TEMPERATURE: 97.5 F | HEIGHT: 62 IN | WEIGHT: 175 LBS | RESPIRATION RATE: 18 BRPM | HEART RATE: 76 BPM | SYSTOLIC BLOOD PRESSURE: 105 MMHG | BODY MASS INDEX: 32.2 KG/M2

## 2023-09-07 ENCOUNTER — NON-APPOINTMENT (OUTPATIENT)
Age: 28
End: 2023-09-07

## 2023-09-08 ENCOUNTER — RESULT REVIEW (OUTPATIENT)
Age: 28
End: 2023-09-08

## 2023-09-08 ENCOUNTER — APPOINTMENT (OUTPATIENT)
Dept: OBGYN | Facility: CLINIC | Age: 28
End: 2023-09-08
Payer: MEDICAID

## 2023-09-08 ENCOUNTER — OUTPATIENT (OUTPATIENT)
Dept: OUTPATIENT SERVICES | Facility: HOSPITAL | Age: 28
LOS: 1 days | End: 2023-09-08
Payer: MEDICAID

## 2023-09-08 VITALS
RESPIRATION RATE: 16 BRPM | HEIGHT: 62 IN | BODY MASS INDEX: 31.83 KG/M2 | WEIGHT: 173 LBS | DIASTOLIC BLOOD PRESSURE: 77 MMHG | TEMPERATURE: 97.8 F | SYSTOLIC BLOOD PRESSURE: 114 MMHG | HEART RATE: 84 BPM | OXYGEN SATURATION: 98 %

## 2023-09-08 DIAGNOSIS — N92.0 EXCESSIVE AND FREQUENT MENSTRUATION WITH REGULAR CYCLE: ICD-10-CM

## 2023-09-08 DIAGNOSIS — Z34.00 ENCOUNTER FOR SUPERVISION OF NORMAL FIRST PREGNANCY, UNSPECIFIED TRIMESTER: ICD-10-CM

## 2023-09-08 DIAGNOSIS — Z30.011 ENCOUNTER FOR INITIAL PRESCRIPTION OF CONTRACEPTIVE PILLS: ICD-10-CM

## 2023-09-08 DIAGNOSIS — Z30.09 ENCOUNTER FOR OTHER GENERAL COUNSELING AND ADVICE ON CONTRACEPTION: ICD-10-CM

## 2023-09-08 PROCEDURE — 83655 ASSAY OF LEAD: CPT

## 2023-09-08 PROCEDURE — 87086 URINE CULTURE/COLONY COUNT: CPT

## 2023-09-08 PROCEDURE — 86765 RUBEOLA ANTIBODY: CPT

## 2023-09-08 PROCEDURE — 86780 TREPONEMA PALLIDUM: CPT

## 2023-09-08 PROCEDURE — 36415 COLL VENOUS BLD VENIPUNCTURE: CPT | Mod: NC

## 2023-09-08 PROCEDURE — 83036 HEMOGLOBIN GLYCOSYLATED A1C: CPT

## 2023-09-08 PROCEDURE — 99214 OFFICE O/P EST MOD 30 MIN: CPT

## 2023-09-08 PROCEDURE — 84439 ASSAY OF FREE THYROXINE: CPT

## 2023-09-08 PROCEDURE — 86480 TB TEST CELL IMMUN MEASURE: CPT

## 2023-09-08 PROCEDURE — 84702 CHORIONIC GONADOTROPIN TEST: CPT

## 2023-09-08 PROCEDURE — 86901 BLOOD TYPING SEROLOGIC RH(D): CPT

## 2023-09-08 PROCEDURE — 87591 N.GONORRHOEAE DNA AMP PROB: CPT

## 2023-09-08 PROCEDURE — 87491 CHLMYD TRACH DNA AMP PROBE: CPT

## 2023-09-08 PROCEDURE — 85025 COMPLETE CBC W/AUTO DIFF WBC: CPT

## 2023-09-08 PROCEDURE — 86850 RBC ANTIBODY SCREEN: CPT

## 2023-09-08 PROCEDURE — 81003 URINALYSIS AUTO W/O SCOPE: CPT

## 2023-09-08 PROCEDURE — 86762 RUBELLA ANTIBODY: CPT

## 2023-09-08 PROCEDURE — 87340 HEPATITIS B SURFACE AG IA: CPT

## 2023-09-08 PROCEDURE — 86900 BLOOD TYPING SEROLOGIC ABO: CPT

## 2023-09-08 PROCEDURE — 81025 URINE PREGNANCY TEST: CPT

## 2023-09-08 PROCEDURE — 86787 VARICELLA-ZOSTER ANTIBODY: CPT

## 2023-09-08 PROCEDURE — 36415 COLL VENOUS BLD VENIPUNCTURE: CPT

## 2023-09-08 PROCEDURE — G0463: CPT

## 2023-09-08 PROCEDURE — 86803 HEPATITIS C AB TEST: CPT

## 2023-09-08 PROCEDURE — 84443 ASSAY THYROID STIM HORMONE: CPT

## 2023-09-08 PROCEDURE — 80053 COMPREHEN METABOLIC PANEL: CPT

## 2023-09-08 RX ORDER — PRENATAL VIT NO.130/IRON/FOLIC 27MG-0.8MG
28-0.8 TABLET ORAL
Qty: 30 | Refills: 11 | Status: ACTIVE | COMMUNITY
Start: 2023-09-08 | End: 1900-01-01

## 2023-09-11 ENCOUNTER — APPOINTMENT (OUTPATIENT)
Dept: ULTRASOUND IMAGING | Facility: IMAGING CENTER | Age: 28
End: 2023-09-11
Payer: MEDICAID

## 2023-09-11 ENCOUNTER — OUTPATIENT (OUTPATIENT)
Dept: OUTPATIENT SERVICES | Facility: HOSPITAL | Age: 28
LOS: 1 days | End: 2023-09-11
Payer: MEDICAID

## 2023-09-11 DIAGNOSIS — Z3A.08 8 WEEKS GESTATION OF PREGNANCY: ICD-10-CM

## 2023-09-11 DIAGNOSIS — Z98.89 OTHER SPECIFIED POSTPROCEDURAL STATES: Chronic | ICD-10-CM

## 2023-09-11 LAB
ABO + RH PNL BLD: NORMAL
ALBUMIN SERPL ELPH-MCNC: 4.3 G/DL
ALP BLD-CCNC: 89 U/L
ALT SERPL-CCNC: 14 U/L
ANION GAP SERPL CALC-SCNC: 13 MMOL/L
AST SERPL-CCNC: 12 U/L
BACTERIA UR CULT: NORMAL
BASOPHILS # BLD AUTO: 0.08 K/UL
BASOPHILS NFR BLD AUTO: 0.8 %
BILIRUB SERPL-MCNC: 0.3 MG/DL
BLD GP AB SCN SERPL QL: NORMAL
BUN SERPL-MCNC: 9 MG/DL
C TRACH RRNA SPEC QL NAA+PROBE: NOT DETECTED
CALCIUM SERPL-MCNC: 9.6 MG/DL
CHLORIDE SERPL-SCNC: 104 MMOL/L
CO2 SERPL-SCNC: 22 MMOL/L
CREAT SERPL-MCNC: 0.67 MG/DL
EGFR: 123 ML/MIN/1.73M2
EOSINOPHIL # BLD AUTO: 0.2 K/UL
EOSINOPHIL NFR BLD AUTO: 2.1 %
ESTIMATED AVERAGE GLUCOSE: 108 MG/DL
GLUCOSE SERPL-MCNC: 92 MG/DL
HBA1C MFR BLD HPLC: 5.4 %
HBV SURFACE AG SER QL: NONREACTIVE
HCG SERPL-MCNC: ABNORMAL MIU/ML
HCT VFR BLD CALC: 38.6 %
HCV AB SER QL: NONREACTIVE
HCV S/CO RATIO: 0.07 S/CO
HGB BLD-MCNC: 12.2 G/DL
IMM GRANULOCYTES NFR BLD AUTO: 0.3 %
LYMPHOCYTES # BLD AUTO: 2.3 K/UL
LYMPHOCYTES NFR BLD AUTO: 24.3 %
MAN DIFF?: NORMAL
MCHC RBC-ENTMCNC: 27.4 PG
MCHC RBC-ENTMCNC: 31.6 GM/DL
MCV RBC AUTO: 86.5 FL
MEV IGG FLD QL IA: 12.9 AU/ML
MEV IGG+IGM SER-IMP: NEGATIVE
MONOCYTES # BLD AUTO: 0.49 K/UL
MONOCYTES NFR BLD AUTO: 5.2 %
N GONORRHOEA RRNA SPEC QL NAA+PROBE: NOT DETECTED
NEUTROPHILS # BLD AUTO: 6.37 K/UL
NEUTROPHILS NFR BLD AUTO: 67.3 %
PLATELET # BLD AUTO: 302 K/UL
POTASSIUM SERPL-SCNC: 4.1 MMOL/L
PROT SERPL-MCNC: 6.9 G/DL
RBC # BLD: 4.46 M/UL
RBC # FLD: 14.1 %
RUBV IGG FLD-ACNC: 2.2 INDEX
RUBV IGG SER-IMP: POSITIVE
SODIUM SERPL-SCNC: 139 MMOL/L
SOURCE TP AMPLIFICATION: NORMAL
T PALLIDUM AB SER QL IA: NEGATIVE
T4 FREE SERPL-MCNC: 1.1 NG/DL
TSH SERPL-ACNC: 0.27 UIU/ML
VZV AB TITR SER: NEGATIVE
VZV IGG SER IF-ACNC: 33.8 INDEX
WBC # FLD AUTO: 9.47 K/UL

## 2023-09-11 PROCEDURE — 76817 TRANSVAGINAL US OBSTETRIC: CPT

## 2023-09-11 PROCEDURE — 76801 OB US < 14 WKS SINGLE FETUS: CPT | Mod: 26

## 2023-09-11 PROCEDURE — 76801 OB US < 14 WKS SINGLE FETUS: CPT

## 2023-09-11 PROCEDURE — 76817 TRANSVAGINAL US OBSTETRIC: CPT | Mod: 26

## 2023-09-12 ENCOUNTER — TRANSCRIPTION ENCOUNTER (OUTPATIENT)
Age: 28
End: 2023-09-12

## 2023-09-12 DIAGNOSIS — Z3A.08 8 WEEKS GESTATION OF PREGNANCY: ICD-10-CM

## 2023-09-12 DIAGNOSIS — R80.9 PROTEINURIA, UNSPECIFIED: ICD-10-CM

## 2023-09-12 DIAGNOSIS — O09.899 SUPERVISION OF OTHER HIGH RISK PREGNANCIES, UNSPECIFIED TRIMESTER: ICD-10-CM

## 2023-09-12 LAB
CYTOLOGY CVX/VAG DOC THIN PREP: ABNORMAL
LEAD BLD-MCNC: <1 UG/DL
M TB IFN-G BLD-IMP: NEGATIVE
QUANTIFERON TB PLUS MITOGEN MINUS NIL: 2.4 IU/ML
QUANTIFERON TB PLUS NIL: 0.01 IU/ML
QUANTIFERON TB PLUS TB1 MINUS NIL: 0 IU/ML
QUANTIFERON TB PLUS TB2 MINUS NIL: 0 IU/ML

## 2023-09-22 ENCOUNTER — NON-APPOINTMENT (OUTPATIENT)
Age: 28
End: 2023-09-22

## 2023-09-22 ENCOUNTER — APPOINTMENT (OUTPATIENT)
Dept: OBGYN | Facility: CLINIC | Age: 28
End: 2023-09-22
Payer: MEDICAID

## 2023-09-22 ENCOUNTER — OUTPATIENT (OUTPATIENT)
Dept: OUTPATIENT SERVICES | Facility: HOSPITAL | Age: 28
LOS: 1 days | End: 2023-09-22
Payer: MEDICAID

## 2023-09-22 VITALS
WEIGHT: 172 LBS | OXYGEN SATURATION: 97 % | RESPIRATION RATE: 18 BRPM | HEART RATE: 67 BPM | TEMPERATURE: 98.1 F | SYSTOLIC BLOOD PRESSURE: 111 MMHG | BODY MASS INDEX: 31.65 KG/M2 | HEIGHT: 62 IN | DIASTOLIC BLOOD PRESSURE: 74 MMHG

## 2023-09-22 DIAGNOSIS — Z34.90 ENCOUNTER FOR SUPERVISION OF NORMAL PREGNANCY, UNSPECIFIED, UNSPECIFIED TRIMESTER: ICD-10-CM

## 2023-09-22 DIAGNOSIS — Z34.00 ENCOUNTER FOR SUPERVISION OF NORMAL FIRST PREGNANCY, UNSPECIFIED TRIMESTER: ICD-10-CM

## 2023-09-22 DIAGNOSIS — Z3A.08 8 WEEKS GESTATION OF PREGNANCY: ICD-10-CM

## 2023-09-22 PROCEDURE — G0463: CPT

## 2023-09-22 PROCEDURE — 99214 OFFICE O/P EST MOD 30 MIN: CPT

## 2023-09-22 PROCEDURE — 81003 URINALYSIS AUTO W/O SCOPE: CPT

## 2023-09-22 RX ORDER — DESOGESTREL AND ETHINYL ESTRADIOL 0.15-0.03
0.15-3 KIT ORAL DAILY
Qty: 30 | Refills: 11 | Status: COMPLETED | COMMUNITY
Start: 2023-03-21 | End: 2023-09-22

## 2023-09-26 DIAGNOSIS — O09.899 SUPERVISION OF OTHER HIGH RISK PREGNANCIES, UNSPECIFIED TRIMESTER: ICD-10-CM

## 2023-09-26 DIAGNOSIS — Z3A.09 9 WEEKS GESTATION OF PREGNANCY: ICD-10-CM

## 2023-09-26 DIAGNOSIS — Z34.91 ENCOUNTER FOR SUPERVISION OF NORMAL PREGNANCY, UNSPECIFIED, FIRST TRIMESTER: ICD-10-CM

## 2023-09-28 ENCOUNTER — TRANSCRIPTION ENCOUNTER (OUTPATIENT)
Age: 28
End: 2023-09-28

## 2023-10-18 ENCOUNTER — NON-APPOINTMENT (OUTPATIENT)
Age: 28
End: 2023-10-18

## 2023-10-19 ENCOUNTER — APPOINTMENT (OUTPATIENT)
Dept: OBGYN | Facility: HOSPITAL | Age: 28
End: 2023-10-19

## 2023-10-19 ENCOUNTER — APPOINTMENT (OUTPATIENT)
Dept: MATERNAL FETAL MEDICINE | Facility: HOSPITAL | Age: 28
End: 2023-10-19

## 2023-10-20 ENCOUNTER — OUTPATIENT (OUTPATIENT)
Dept: OUTPATIENT SERVICES | Facility: HOSPITAL | Age: 28
LOS: 1 days | End: 2023-10-20
Payer: MEDICAID

## 2023-10-20 ENCOUNTER — APPOINTMENT (OUTPATIENT)
Dept: ANTEPARTUM | Facility: CLINIC | Age: 28
End: 2023-10-20
Payer: MEDICAID

## 2023-10-20 ENCOUNTER — APPOINTMENT (OUTPATIENT)
Dept: OBGYN | Facility: CLINIC | Age: 28
End: 2023-10-20
Payer: MEDICAID

## 2023-10-20 ENCOUNTER — ASOB RESULT (OUTPATIENT)
Age: 28
End: 2023-10-20

## 2023-10-20 VITALS
BODY MASS INDEX: 32.02 KG/M2 | HEART RATE: 72 BPM | SYSTOLIC BLOOD PRESSURE: 96 MMHG | HEIGHT: 62 IN | OXYGEN SATURATION: 96 % | DIASTOLIC BLOOD PRESSURE: 64 MMHG | TEMPERATURE: 97.5 F | RESPIRATION RATE: 18 BRPM | WEIGHT: 174 LBS

## 2023-10-20 DIAGNOSIS — Z34.91 ENCOUNTER FOR SUPERVISION OF NORMAL PREGNANCY, UNSPECIFIED, FIRST TRIMESTER: ICD-10-CM

## 2023-10-20 DIAGNOSIS — Z34.00 ENCOUNTER FOR SUPERVISION OF NORMAL FIRST PREGNANCY, UNSPECIFIED TRIMESTER: ICD-10-CM

## 2023-10-20 PROCEDURE — 84550 ASSAY OF BLOOD/URIC ACID: CPT

## 2023-10-20 PROCEDURE — 84156 ASSAY OF PROTEIN URINE: CPT

## 2023-10-20 PROCEDURE — 85025 COMPLETE CBC W/AUTO DIFF WBC: CPT

## 2023-10-20 PROCEDURE — 81003 URINALYSIS AUTO W/O SCOPE: CPT

## 2023-10-20 PROCEDURE — 36415 COLL VENOUS BLD VENIPUNCTURE: CPT | Mod: NC

## 2023-10-20 PROCEDURE — 36415 COLL VENOUS BLD VENIPUNCTURE: CPT

## 2023-10-20 PROCEDURE — 99214 OFFICE O/P EST MOD 30 MIN: CPT

## 2023-10-20 PROCEDURE — 76815 OB US LIMITED FETUS(S): CPT

## 2023-10-20 PROCEDURE — G0463: CPT

## 2023-10-20 PROCEDURE — 80053 COMPREHEN METABOLIC PANEL: CPT

## 2023-10-23 ENCOUNTER — OUTPATIENT (OUTPATIENT)
Dept: OUTPATIENT SERVICES | Facility: HOSPITAL | Age: 28
LOS: 1 days | End: 2023-10-23
Payer: MEDICAID

## 2023-10-23 DIAGNOSIS — Z34.92 ENCOUNTER FOR SUPERVISION OF NORMAL PREGNANCY, UNSPECIFIED, SECOND TRIMESTER: ICD-10-CM

## 2023-10-23 DIAGNOSIS — Z98.89 OTHER SPECIFIED POSTPROCEDURAL STATES: Chronic | ICD-10-CM

## 2023-10-23 DIAGNOSIS — O09.899 SUPERVISION OF OTHER HIGH RISK PREGNANCIES, UNSPECIFIED TRIMESTER: ICD-10-CM

## 2023-10-23 DIAGNOSIS — R80.9 PROTEINURIA, UNSPECIFIED: ICD-10-CM

## 2023-10-23 DIAGNOSIS — Z34.90 ENCOUNTER FOR SUPERVISION OF NORMAL PREGNANCY, UNSPECIFIED, UNSPECIFIED TRIMESTER: ICD-10-CM

## 2023-10-23 LAB
ALBUMIN SERPL ELPH-MCNC: 4.1 G/DL
ALP BLD-CCNC: 80 U/L
ALT SERPL-CCNC: 11 U/L
ANION GAP SERPL CALC-SCNC: 13 MMOL/L
AST SERPL-CCNC: 10 U/L
BASOPHILS # BLD AUTO: 0.06 K/UL
BASOPHILS NFR BLD AUTO: 0.6 %
BILIRUB SERPL-MCNC: 0.2 MG/DL
BUN SERPL-MCNC: 7 MG/DL
CALCIUM SERPL-MCNC: 9.3 MG/DL
CHLORIDE SERPL-SCNC: 102 MMOL/L
CO2 SERPL-SCNC: 22 MMOL/L
CREAT SERPL-MCNC: 0.59 MG/DL
CREAT SPEC-SCNC: 195 MG/DL
CREAT/PROT UR: 0.4 RATIO
EGFR: 127 ML/MIN/1.73M2
EOSINOPHIL # BLD AUTO: 0.29 K/UL
EOSINOPHIL NFR BLD AUTO: 2.9 %
GLUCOSE SERPL-MCNC: 89 MG/DL
HCT VFR BLD CALC: 36.7 %
HGB BLD-MCNC: 11.6 G/DL
IMM GRANULOCYTES NFR BLD AUTO: 0.3 %
LYMPHOCYTES # BLD AUTO: 2.23 K/UL
LYMPHOCYTES NFR BLD AUTO: 22.2 %
MAN DIFF?: NORMAL
MCHC RBC-ENTMCNC: 27.9 PG
MCHC RBC-ENTMCNC: 31.6 GM/DL
MCV RBC AUTO: 88.2 FL
MONOCYTES # BLD AUTO: 0.54 K/UL
MONOCYTES NFR BLD AUTO: 5.4 %
NEUTROPHILS # BLD AUTO: 6.88 K/UL
NEUTROPHILS NFR BLD AUTO: 68.6 %
PLATELET # BLD AUTO: 284 K/UL
POTASSIUM SERPL-SCNC: 4 MMOL/L
PROT SERPL-MCNC: 6.5 G/DL
PROT UR-MCNC: 78 MG/DL
RBC # BLD: 4.16 M/UL
RBC # FLD: 14.6 %
SODIUM SERPL-SCNC: 138 MMOL/L
URATE SERPL-MCNC: 4.1 MG/DL
WBC # FLD AUTO: 10.03 K/UL

## 2023-10-23 PROCEDURE — 84156 ASSAY OF PROTEIN URINE: CPT

## 2023-10-23 PROCEDURE — 81420 FETAL CHRMOML ANEUPLOIDY: CPT

## 2023-10-24 LAB
CREAT 24H UR-MCNC: 0.6 G/24 H
CREAT ?TM UR-MCNC: 88 MG/DL
PROT 24H UR-MRATE: 19 MG/DL
PROT ?TM UR-MCNC: 24 HR
PROT UR-MCNC: 119 MG/24 H
SPECIMEN VOL 24H UR: 625 ML

## 2023-10-26 ENCOUNTER — APPOINTMENT (OUTPATIENT)
Dept: NEPHROLOGY | Facility: CLINIC | Age: 28
End: 2023-10-26
Payer: MEDICAID

## 2023-10-26 ENCOUNTER — LABORATORY RESULT (OUTPATIENT)
Age: 28
End: 2023-10-26

## 2023-10-26 VITALS
HEART RATE: 91 BPM | OXYGEN SATURATION: 97 % | WEIGHT: 177.25 LBS | SYSTOLIC BLOOD PRESSURE: 128 MMHG | HEIGHT: 62 IN | DIASTOLIC BLOOD PRESSURE: 70 MMHG | BODY MASS INDEX: 32.62 KG/M2 | TEMPERATURE: 98 F

## 2023-10-26 VITALS — DIASTOLIC BLOOD PRESSURE: 70 MMHG | SYSTOLIC BLOOD PRESSURE: 110 MMHG

## 2023-10-26 PROCEDURE — 99205 OFFICE O/P NEW HI 60 MIN: CPT

## 2023-10-27 LAB
CHROMOSOME13 INTERPRETATION: NORMAL
CHROMOSOME13 TEST RESULT: NORMAL
CHROMOSOME18 INTERPRETATION: NORMAL
CHROMOSOME18 TEST RESULT: NORMAL
CHROMOSOME21 INTERPRETATION: NORMAL
CHROMOSOME21 TEST RESULT: NORMAL
FETAL FRACTION: NORMAL
PERFORMANCE AND LIMITATIONS: NORMAL
SEX CHROMOSOME INTERPRETATION: NORMAL
SEX CHROMOSOME TEST RESULT: NORMAL
VERIFI PRENATAL TEST: NOT DETECTED

## 2023-10-27 RX ORDER — ASPIRIN 81 MG/1
81 TABLET, CHEWABLE ORAL
Qty: 60 | Refills: 9 | Status: ACTIVE | COMMUNITY
Start: 2023-10-27 | End: 1900-01-01

## 2023-11-16 ENCOUNTER — NON-APPOINTMENT (OUTPATIENT)
Age: 28
End: 2023-11-16

## 2023-11-17 ENCOUNTER — APPOINTMENT (OUTPATIENT)
Dept: OBGYN | Facility: CLINIC | Age: 28
End: 2023-11-17
Payer: MEDICAID

## 2023-11-17 ENCOUNTER — OUTPATIENT (OUTPATIENT)
Dept: OUTPATIENT SERVICES | Facility: HOSPITAL | Age: 28
LOS: 1 days | End: 2023-11-17
Payer: MEDICAID

## 2023-11-17 ENCOUNTER — NON-APPOINTMENT (OUTPATIENT)
Age: 28
End: 2023-11-17

## 2023-11-17 VITALS
HEART RATE: 84 BPM | BODY MASS INDEX: 33.31 KG/M2 | WEIGHT: 181 LBS | SYSTOLIC BLOOD PRESSURE: 100 MMHG | DIASTOLIC BLOOD PRESSURE: 62 MMHG | HEIGHT: 62 IN | RESPIRATION RATE: 18 BRPM | TEMPERATURE: 97.5 F | OXYGEN SATURATION: 99 %

## 2023-11-17 DIAGNOSIS — Z34.92 ENCOUNTER FOR SUPERVISION OF NORMAL PREGNANCY, UNSPECIFIED, SECOND TRIMESTER: ICD-10-CM

## 2023-11-17 DIAGNOSIS — Z98.89 OTHER SPECIFIED POSTPROCEDURAL STATES: Chronic | ICD-10-CM

## 2023-11-17 DIAGNOSIS — Z34.00 ENCOUNTER FOR SUPERVISION OF NORMAL FIRST PREGNANCY, UNSPECIFIED TRIMESTER: ICD-10-CM

## 2023-11-17 DIAGNOSIS — O09.899 SUPERVISION OF OTHER HIGH RISK PREGNANCIES, UNSPECIFIED TRIMESTER: ICD-10-CM

## 2023-11-17 PROCEDURE — 99214 OFFICE O/P EST MOD 30 MIN: CPT

## 2023-11-17 PROCEDURE — 82105 ALPHA-FETOPROTEIN SERUM: CPT

## 2023-11-17 PROCEDURE — 36415 COLL VENOUS BLD VENIPUNCTURE: CPT

## 2023-11-17 PROCEDURE — G0463: CPT

## 2023-11-17 PROCEDURE — 81003 URINALYSIS AUTO W/O SCOPE: CPT

## 2023-11-17 PROCEDURE — 36415 COLL VENOUS BLD VENIPUNCTURE: CPT | Mod: NC

## 2023-11-20 DIAGNOSIS — Z34.92 ENCOUNTER FOR SUPERVISION OF NORMAL PREGNANCY, UNSPECIFIED, SECOND TRIMESTER: ICD-10-CM

## 2023-11-20 DIAGNOSIS — R31.29 OTHER MICROSCOPIC HEMATURIA: ICD-10-CM

## 2023-11-20 DIAGNOSIS — R80.9 PROTEINURIA, UNSPECIFIED: ICD-10-CM

## 2023-11-20 DIAGNOSIS — O09.899 SUPERVISION OF OTHER HIGH RISK PREGNANCIES, UNSPECIFIED TRIMESTER: ICD-10-CM

## 2023-11-22 LAB
AFP MOM: 1.43
AFP VALUE: 55.1 NG/ML
ALPHA FETOPROTEIN SERUM COMMENT: NORMAL
ALPHA FETOPROTEIN SERUM INTERPRETATION: NORMAL
ALPHA FETOPROTEIN SERUM RESULTS: NORMAL
ALPHA FETOPROTEIN SERUM TEST RESULTS: NORMAL
GESTATIONAL AGE BASED ON: NORMAL
GESTATIONAL AGE ON COLLECTION DATE: 17.9 WEEKS
INSULIN DEP DIABETES: NO
MATERNAL AGE AT EDD AFP: 28.3 YR
MULTIPLE GESTATION: NO
OSBR RISK 1 IN: 3311
RACE: NORMAL
WEIGHT AFP: 181 LBS

## 2023-11-28 ENCOUNTER — NON-APPOINTMENT (OUTPATIENT)
Age: 28
End: 2023-11-28

## 2023-11-29 ENCOUNTER — NON-APPOINTMENT (OUTPATIENT)
Age: 28
End: 2023-11-29

## 2023-11-29 LAB
ALBUMIN MFR SERPL ELPH: 52.7 %
ALBUMIN SERPL ELPH-MCNC: 4 G/DL
ALBUMIN SERPL ELPH-MCNC: 4 G/DL
ALBUMIN SERPL-MCNC: 3.6 G/DL
ALBUMIN/GLOB SERPL: 1.1 RATIO
ALP BLD-CCNC: 83 U/L
ALPHA1 GLOB MFR SERPL ELPH: 5.8 %
ALPHA1 GLOB SERPL ELPH-MCNC: 0.4 G/DL
ALPHA2 GLOB MFR SERPL ELPH: 13.7 %
ALPHA2 GLOB SERPL ELPH-MCNC: 0.9 G/DL
ALT SERPL-CCNC: 9 U/L
ANA SER IF-ACNC: NEGATIVE
ANION GAP SERPL CALC-SCNC: 16 MMOL/L
APPEARANCE: CLEAR
AST SERPL-CCNC: 11 U/L
B-GLOBULIN MFR SERPL ELPH: 15.5 %
B-GLOBULIN SERPL ELPH-MCNC: 1.1 G/DL
BACTERIA UR CULT: NORMAL
BACTERIA: NEGATIVE /HPF
BILIRUB DIRECT SERPL-MCNC: 0 MG/DL
BILIRUB INDIRECT SERPL-MCNC: 0.1 MG/DL
BILIRUB SERPL-MCNC: <0.2 MG/DL
BILIRUBIN URINE: NEGATIVE
BLOOD URINE: ABNORMAL
BUN SERPL-MCNC: 10 MG/DL
C3 SERPL-MCNC: 204 MG/DL
C4 SERPL-MCNC: 46 MG/DL
CALCIUM SERPL-MCNC: 9.7 MG/DL
CAST: 0 /LPF
CHLORIDE SERPL-SCNC: 103 MMOL/L
CO2 SERPL-SCNC: 19 MMOL/L
COLOR: YELLOW
CREAT SERPL-MCNC: 0.55 MG/DL
CREAT SPEC-SCNC: 89 MG/DL
CREAT/PROT UR: 0.5 RATIO
DEPRECATED KAPPA LC FREE/LAMBDA SER: 1.1 RATIO
DSDNA AB SER-ACNC: <12 IU/ML
EGFR: 129 ML/MIN/1.73M2
EPITHELIAL CELLS: 8 /HPF
ESTIMATED AVERAGE GLUCOSE: 103 MG/DL
FERRITIN SERPL-MCNC: 32 NG/ML
GAMMA GLOB FLD ELPH-MCNC: 0.8 G/DL
GAMMA GLOB MFR SERPL ELPH: 12.3 %
GBM AB TITR SER IF: <0.2
GLUCOSE QUALITATIVE U: NEGATIVE MG/DL
GLUCOSE SERPL-MCNC: 101 MG/DL
HAPTOGLOB SERPL-MCNC: 211 MG/DL
HBA1C MFR BLD HPLC: 5.2 %
HBV CORE IGG+IGM SER QL: NONREACTIVE
HBV CORE IGM SER QL: NONREACTIVE
HBV SURFACE AB SER QL: REACTIVE
HBV SURFACE AB SERPL IA-ACNC: 119.3 MIU/ML
HCT VFR BLD CALC: 38.4 %
HCV AB SER QL: NONREACTIVE
HCV S/CO RATIO: 0.06 S/CO
HGB BLD-MCNC: 11.9 G/DL
HIV1+2 AB SPEC QL IA.RAPID: NONREACTIVE
INTERPRETATION SERPL IEP-IMP: NORMAL
IRON SATN MFR SERPL: 17 %
IRON SERPL-MCNC: 63 UG/DL
KAPPA LC CSF-MCNC: 1.36 MG/DL
KAPPA LC SERPL-MCNC: 1.5 MG/DL
KETONES URINE: NEGATIVE MG/DL
LDH SERPL-CCNC: 139 U/L
LEUKOCYTE ESTERASE URINE: NEGATIVE
M PROTEIN SPEC IFE-MCNC: NORMAL
MAGNESIUM SERPL-MCNC: 1.7 MG/DL
MCHC RBC-ENTMCNC: 27.4 PG
MCHC RBC-ENTMCNC: 31 GM/DL
MCV RBC AUTO: 88.3 FL
MICROSCOPIC-UA: NORMAL
NITRITE URINE: NEGATIVE
PH URINE: 6
PHOSPHATE SERPL-MCNC: 3.9 MG/DL
PHOSPHOLIPASE A2 RECEPTOR ELISA: <1.8 RU/ML
PLATELET # BLD AUTO: 258 K/UL
POTASSIUM SERPL-SCNC: 3.9 MMOL/L
PROT SERPL-MCNC: 6.8 G/DL
PROT UR-MCNC: 44 MG/DL
PROTEIN URINE: 30 MG/DL
RBC # BLD: 4.35 M/UL
RBC # FLD: 14.5 %
RED BLOOD CELLS URINE: 4 /HPF
RPR SER-TITR: NORMAL
SODIUM SERPL-SCNC: 138 MMOL/L
SPECIFIC GRAVITY URINE: 1.01
TIBC SERPL-MCNC: 379 UG/DL
UIBC SERPL-MCNC: 316 UG/DL
URATE SERPL-MCNC: 4.8 MG/DL
UROBILINOGEN URINE: 0.2 MG/DL
WBC # FLD AUTO: 9.58 K/UL
WHITE BLOOD CELLS URINE: 2 /HPF

## 2023-12-01 ENCOUNTER — APPOINTMENT (OUTPATIENT)
Dept: ANTEPARTUM | Facility: CLINIC | Age: 28
End: 2023-12-01
Payer: MEDICAID

## 2023-12-01 ENCOUNTER — ASOB RESULT (OUTPATIENT)
Age: 28
End: 2023-12-01

## 2023-12-01 ENCOUNTER — APPOINTMENT (OUTPATIENT)
Dept: OBGYN | Facility: CLINIC | Age: 28
End: 2023-12-01

## 2023-12-01 ENCOUNTER — APPOINTMENT (OUTPATIENT)
Dept: ULTRASOUND IMAGING | Facility: CLINIC | Age: 28
End: 2023-12-01
Payer: MEDICAID

## 2023-12-01 PROCEDURE — 76770 US EXAM ABDO BACK WALL COMP: CPT

## 2023-12-01 PROCEDURE — 76811 OB US DETAILED SNGL FETUS: CPT

## 2023-12-05 DIAGNOSIS — Q87.81 ALPORT SYNDROME: ICD-10-CM

## 2023-12-07 ENCOUNTER — APPOINTMENT (OUTPATIENT)
Dept: MATERNAL FETAL MEDICINE | Facility: CLINIC | Age: 28
End: 2023-12-07
Payer: MEDICAID

## 2023-12-07 ENCOUNTER — ASOB RESULT (OUTPATIENT)
Age: 28
End: 2023-12-07

## 2023-12-07 PROCEDURE — 99214 OFFICE O/P EST MOD 30 MIN: CPT | Mod: 95

## 2023-12-07 PROCEDURE — 99204 OFFICE O/P NEW MOD 45 MIN: CPT | Mod: 95

## 2023-12-08 ENCOUNTER — APPOINTMENT (OUTPATIENT)
Dept: ANTEPARTUM | Facility: CLINIC | Age: 28
End: 2023-12-08
Payer: MEDICAID

## 2023-12-08 ENCOUNTER — ASOB RESULT (OUTPATIENT)
Age: 28
End: 2023-12-08

## 2023-12-08 PROCEDURE — 76816 OB US FOLLOW-UP PER FETUS: CPT

## 2023-12-14 ENCOUNTER — APPOINTMENT (OUTPATIENT)
Dept: NEPHROLOGY | Facility: CLINIC | Age: 28
End: 2023-12-14

## 2023-12-18 ENCOUNTER — NON-APPOINTMENT (OUTPATIENT)
Age: 28
End: 2023-12-18

## 2023-12-22 ENCOUNTER — APPOINTMENT (OUTPATIENT)
Dept: OBGYN | Facility: CLINIC | Age: 28
End: 2023-12-22

## 2023-12-29 ENCOUNTER — ASOB RESULT (OUTPATIENT)
Age: 28
End: 2023-12-29

## 2023-12-29 ENCOUNTER — APPOINTMENT (OUTPATIENT)
Dept: ANTEPARTUM | Facility: CLINIC | Age: 28
End: 2023-12-29
Payer: MEDICAID

## 2023-12-29 PROCEDURE — 76816 OB US FOLLOW-UP PER FETUS: CPT

## 2024-01-03 ENCOUNTER — ASOB RESULT (OUTPATIENT)
Age: 29
End: 2024-01-03

## 2024-01-03 ENCOUNTER — APPOINTMENT (OUTPATIENT)
Dept: ANTEPARTUM | Facility: CLINIC | Age: 29
End: 2024-01-03
Payer: MEDICAID

## 2024-01-03 VITALS
SYSTOLIC BLOOD PRESSURE: 109 MMHG | BODY MASS INDEX: 34.6 KG/M2 | DIASTOLIC BLOOD PRESSURE: 97 MMHG | WEIGHT: 188 LBS | HEART RATE: 102 BPM | HEIGHT: 62 IN

## 2024-01-03 PROCEDURE — 99204 OFFICE O/P NEW MOD 45 MIN: CPT | Mod: 25

## 2024-01-03 PROCEDURE — 99214 OFFICE O/P EST MOD 30 MIN: CPT | Mod: 25

## 2024-01-08 LAB
ALBUMIN SERPL ELPH-MCNC: 3.7 G/DL
ALP BLD-CCNC: 101 U/L
ALT SERPL-CCNC: 7 U/L
ANION GAP SERPL CALC-SCNC: 21 MMOL/L
APTT BLD: 29.6 SEC
AST SERPL-CCNC: 13 U/L
BILIRUB SERPL-MCNC: <0.2 MG/DL
BUN SERPL-MCNC: 8 MG/DL
CALCIUM SERPL-MCNC: 9.1 MG/DL
CHLORIDE SERPL-SCNC: 99 MMOL/L
CO2 SERPL-SCNC: 20 MMOL/L
CREAT SERPL-MCNC: 0.53 MG/DL
EGFR: 129 ML/MIN/1.73M2
GLUCOSE SERPL-MCNC: 49 MG/DL
INR PPP: 0.95 RATIO
POTASSIUM SERPL-SCNC: 4.1 MMOL/L
PROT SERPL-MCNC: 6.4 G/DL
PT BLD: 10.9 SEC
SODIUM SERPL-SCNC: 139 MMOL/L

## 2024-01-10 ENCOUNTER — NON-APPOINTMENT (OUTPATIENT)
Age: 29
End: 2024-01-10

## 2024-01-26 ENCOUNTER — APPOINTMENT (OUTPATIENT)
Dept: ANTEPARTUM | Facility: CLINIC | Age: 29
End: 2024-01-26

## 2024-02-12 LAB
HCT VFR BLD CALC: 34.1 %
HGB BLD-MCNC: 10.4 G/DL
MCHC RBC-ENTMCNC: 27.4 PG
MCHC RBC-ENTMCNC: 30.5 GM/DL
MCV RBC AUTO: 90 FL
PLATELET # BLD AUTO: 265 K/UL
RBC # BLD: 3.79 M/UL
RBC # FLD: 15 %
WBC # FLD AUTO: 11.86 K/UL

## 2024-02-16 ENCOUNTER — NON-APPOINTMENT (OUTPATIENT)
Age: 29
End: 2024-02-16

## 2024-02-21 ENCOUNTER — ASOB RESULT (OUTPATIENT)
Age: 29
End: 2024-02-21

## 2024-02-21 ENCOUNTER — APPOINTMENT (OUTPATIENT)
Dept: ANTEPARTUM | Facility: CLINIC | Age: 29
End: 2024-02-21
Payer: MEDICAID

## 2024-02-21 VITALS
DIASTOLIC BLOOD PRESSURE: 71 MMHG | SYSTOLIC BLOOD PRESSURE: 106 MMHG | HEIGHT: 62 IN | HEART RATE: 97 BPM | WEIGHT: 192 LBS | BODY MASS INDEX: 35.33 KG/M2

## 2024-02-21 PROCEDURE — 99215 OFFICE O/P EST HI 40 MIN: CPT | Mod: 25

## 2024-02-21 PROCEDURE — 76819 FETAL BIOPHYS PROFIL W/O NST: CPT | Mod: 59

## 2024-02-21 PROCEDURE — 76816 OB US FOLLOW-UP PER FETUS: CPT

## 2024-02-26 ENCOUNTER — ASOB RESULT (OUTPATIENT)
Age: 29
End: 2024-02-26

## 2024-02-26 ENCOUNTER — APPOINTMENT (OUTPATIENT)
Dept: ANTEPARTUM | Facility: CLINIC | Age: 29
End: 2024-02-26
Payer: MEDICAID

## 2024-02-26 LAB
ALBUMIN SERPL ELPH-MCNC: 3.5 G/DL
ALP BLD-CCNC: 98 U/L
ALT SERPL-CCNC: 9 U/L
ANION GAP SERPL CALC-SCNC: 13 MMOL/L
AST SERPL-CCNC: 11 U/L
BACTERIA UR CULT: NORMAL
BILIRUB SERPL-MCNC: <0.2 MG/DL
BUN SERPL-MCNC: 7 MG/DL
CALCIUM SERPL-MCNC: 8.8 MG/DL
CHLORIDE SERPL-SCNC: 101 MMOL/L
CO2 SERPL-SCNC: 24 MMOL/L
CREAT SERPL-MCNC: 0.55 MG/DL
EGFR: 128 ML/MIN/1.73M2
GLUCOSE SERPL-MCNC: 155 MG/DL
HCT VFR BLD CALC: 35.2 %
HGB BLD-MCNC: 10.9 G/DL
MCHC RBC-ENTMCNC: 28.1 PG
MCHC RBC-ENTMCNC: 31 GM/DL
MCV RBC AUTO: 90.7 FL
PLATELET # BLD AUTO: 226 K/UL
POTASSIUM SERPL-SCNC: 3.9 MMOL/L
PROT SERPL-MCNC: 6.4 G/DL
RBC # BLD: 3.88 M/UL
RBC # FLD: 15 %
SODIUM SERPL-SCNC: 138 MMOL/L
WBC # FLD AUTO: 11.38 K/UL

## 2024-02-26 PROCEDURE — 76818 FETAL BIOPHYS PROFILE W/NST: CPT

## 2024-03-04 ENCOUNTER — ASOB RESULT (OUTPATIENT)
Age: 29
End: 2024-03-04

## 2024-03-04 ENCOUNTER — APPOINTMENT (OUTPATIENT)
Dept: ANTEPARTUM | Facility: CLINIC | Age: 29
End: 2024-03-04
Payer: MEDICAID

## 2024-03-04 PROCEDURE — 76818 FETAL BIOPHYS PROFILE W/NST: CPT

## 2024-03-11 ENCOUNTER — APPOINTMENT (OUTPATIENT)
Dept: ANTEPARTUM | Facility: CLINIC | Age: 29
End: 2024-03-11
Payer: MEDICAID

## 2024-03-11 ENCOUNTER — ASOB RESULT (OUTPATIENT)
Age: 29
End: 2024-03-11

## 2024-03-11 PROCEDURE — 76818 FETAL BIOPHYS PROFILE W/NST: CPT

## 2024-03-14 LAB
CREAT SPEC-SCNC: 190 MG/DL
CREAT SPEC-SCNC: 254 MG/DL
CREAT/PROT UR: 0.5 RATIO
CREAT/PROT UR: 0.7 RATIO
PROT UR-MCNC: 102 MG/DL
PROT UR-MCNC: 181 MG/DL

## 2024-03-18 ENCOUNTER — ASOB RESULT (OUTPATIENT)
Age: 29
End: 2024-03-18

## 2024-03-18 ENCOUNTER — APPOINTMENT (OUTPATIENT)
Dept: ANTEPARTUM | Facility: CLINIC | Age: 29
End: 2024-03-18
Payer: MEDICAID

## 2024-03-18 VITALS
SYSTOLIC BLOOD PRESSURE: 105 MMHG | HEART RATE: 102 BPM | HEIGHT: 62 IN | DIASTOLIC BLOOD PRESSURE: 70 MMHG | WEIGHT: 200 LBS | BODY MASS INDEX: 36.8 KG/M2

## 2024-03-18 PROCEDURE — 76816 OB US FOLLOW-UP PER FETUS: CPT

## 2024-03-18 PROCEDURE — 76818 FETAL BIOPHYS PROFILE W/NST: CPT | Mod: 59

## 2024-03-18 PROCEDURE — 99215 OFFICE O/P EST HI 40 MIN: CPT | Mod: 25

## 2024-03-18 PROCEDURE — ZZZZZ: CPT

## 2024-03-25 ENCOUNTER — APPOINTMENT (OUTPATIENT)
Dept: ANTEPARTUM | Facility: CLINIC | Age: 29
End: 2024-03-25

## 2024-03-25 LAB
ALBUMIN SERPL ELPH-MCNC: 3.3 G/DL
ALP BLD-CCNC: 111 U/L
ALT SERPL-CCNC: 13 U/L
ANION GAP SERPL CALC-SCNC: 13 MMOL/L
AST SERPL-CCNC: 12 U/L
BACTERIA UR CULT: NORMAL
BILIRUB SERPL-MCNC: <0.2 MG/DL
BUN SERPL-MCNC: 8 MG/DL
CALCIUM SERPL-MCNC: 8.7 MG/DL
CHLORIDE SERPL-SCNC: 105 MMOL/L
CO2 SERPL-SCNC: 20 MMOL/L
CREAT 24H UR-MCNC: 0.7 G/24 H
CREAT ?TM UR-MCNC: 175 MG/DL
CREAT SERPL-MCNC: 0.52 MG/DL
EGFR: 130 ML/MIN/1.73M2
FERRITIN SERPL-MCNC: 12 NG/ML
FERRITIN SERPL-MCNC: 13 NG/ML
GLUCOSE SERPL-MCNC: 91 MG/DL
HCT VFR BLD CALC: 32.4 %
HGB BLD-MCNC: 10.1 G/DL
IRON SATN MFR SERPL: 13 %
IRON SERPL-MCNC: 61 UG/DL
MCHC RBC-ENTMCNC: 27.2 PG
MCHC RBC-ENTMCNC: 31.2 GM/DL
MCV RBC AUTO: 87.3 FL
PLATELET # BLD AUTO: 244 K/UL
POTASSIUM SERPL-SCNC: 3.9 MMOL/L
PROT 24H UR-MRATE: 130 MG/DL
PROT ?TM UR-MCNC: 24 HR
PROT SERPL-MCNC: 6 G/DL
PROT UR-MCNC: 520 MG/24 H
RBC # BLD: 3.71 M/UL
RBC # FLD: 14.6 %
SODIUM SERPL-SCNC: 138 MMOL/L
SPECIMEN VOL 24H UR: 400 ML
TIBC SERPL-MCNC: 454 UG/DL
UIBC SERPL-MCNC: 393 UG/DL
WBC # FLD AUTO: 10.21 K/UL

## 2024-03-27 ENCOUNTER — APPOINTMENT (OUTPATIENT)
Dept: NEPHROLOGY | Facility: CLINIC | Age: 29
End: 2024-03-27
Payer: MEDICAID

## 2024-03-27 VITALS
HEIGHT: 62 IN | SYSTOLIC BLOOD PRESSURE: 105 MMHG | HEART RATE: 109 BPM | WEIGHT: 202.21 LBS | DIASTOLIC BLOOD PRESSURE: 63 MMHG | OXYGEN SATURATION: 98 % | BODY MASS INDEX: 37.21 KG/M2 | TEMPERATURE: 97.7 F

## 2024-03-27 PROCEDURE — 99215 OFFICE O/P EST HI 40 MIN: CPT

## 2024-03-27 PROCEDURE — G2211 COMPLEX E/M VISIT ADD ON: CPT | Mod: NC,1L

## 2024-03-27 NOTE — ASSESSMENT
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 +/- other GN serology neg recommend to follow cr/ proteinuria closely explained importance of postpartum follow up with us will need biopsy post partum explained what renal biopsy entails explained need to figure out what is going on/degree of scarring/and future treatments post partum with renal biopsy -check renal panel today  #proteinuria stable/mild uptrend during pregnancy prot/cr 0.4 May 2022 prot/cr 0.3-0.4 2022 July 2022 prot/cr 0.4-0.5 Oct 2023 prot/cr 0.7 Jan 2024 prot/cr 0.5 Feb 2024 24 hr collection 520mg March 25 check ua, prot/cr today  #BP stable  # PEC ppx cont baby asa check PEC /HELLPS labs today  discussed all in detail with pt today

## 2024-03-27 NOTE — PHYSICAL EXAM
[General Appearance - Alert] : alert [General Appearance - In No Acute Distress] : in no acute distress [General Appearance - Well Nourished] : well nourished [General Appearance - Well Developed] : well developed [Sclera] : the sclera and conjunctiva were normal [Outer Ear] : the ears and nose were normal in appearance [Neck Appearance] : the appearance of the neck was normal [] : no respiratory distress [Respiration, Rhythm And Depth] : normal respiratory rhythm and effort [Auscultation Breath Sounds / Voice Sounds] : lungs were clear to auscultation bilaterally [Apical Impulse] : the apical impulse was normal [Heart Rate And Rhythm] : heart rate was normal and rhythm regular [Heart Sounds] : normal S1 and S2 [Edema] : there was no peripheral edema [Bowel Sounds] : normal bowel sounds [Skin Color & Pigmentation] : normal skin color and pigmentation [Skin Turgor] : normal skin turgor [No Focal Deficits] : no focal deficits [Oriented To Time, Place, And Person] : oriented to person, place, and time [Impaired Insight] : insight and judgment were intact [Affect] : the affect was normal [Mood] : the mood was normal [No CVA Tenderness] : no ~M costovertebral angle tenderness [Abnormal Walk] : normal gait [Nail Clubbing] : no clubbing  or cyanosis of the fingernails [Musculoskeletal - Swelling] : no joint swelling seen

## 2024-03-27 NOTE — HISTORY OF PRESENT ILLNESS
[FreeTextEntry1] : Here for followup Now 36 weeks pregnant Last OB Monday this week; Now with New OB is Joel Steinberg; Will deliver at [facility] now All going well- has 3 hr GTT to be done; on last growth scan she was told "baby stomach was big" and needs follow up growth scan;  Sees MFM weekly; Has NST weekly as well weekly; Missed this week KEERTHI is April 20th; planned for induction but she is not sure when   Patient denies any blurred vision, double vision, headaches, hand swelling, leg swelling, or RUQ pain Patient denies any burning or pain with urination. No foamy urine or bubbly urine Feels baby movements   Other ROS neg  March 18th labs Hb 10.1- takes liquid iron cr 0.52  24 hr urine 520mg March 18th 2024 prot/cr 0.5 Feb 2024   Meds: Baby asa 81mg/162mg prenatal iron liq

## 2024-03-28 ENCOUNTER — NON-APPOINTMENT (OUTPATIENT)
Age: 29
End: 2024-03-28

## 2024-03-28 DIAGNOSIS — R80.9 PROTEINURIA, UNSPECIFIED: ICD-10-CM

## 2024-03-28 DIAGNOSIS — R31.29 OTHER MICROSCOPIC HEMATURIA: ICD-10-CM

## 2024-03-28 LAB
ALBUMIN SERPL ELPH-MCNC: 3.6 G/DL
ALBUMIN SERPL ELPH-MCNC: 3.6 G/DL
ALP BLD-CCNC: 117 U/L
ALT SERPL-CCNC: 11 U/L
ANION GAP SERPL CALC-SCNC: 18 MMOL/L
APPEARANCE: ABNORMAL
AST SERPL-CCNC: 11 U/L
BACTERIA: ABNORMAL /HPF
BASOPHILS # BLD AUTO: 0.05 K/UL
BASOPHILS NFR BLD AUTO: 0.5 %
BILIRUB DIRECT SERPL-MCNC: 0.1 MG/DL
BILIRUB INDIRECT SERPL-MCNC: NORMAL MG/DL
BILIRUB SERPL-MCNC: <0.2 MG/DL
BILIRUBIN URINE: NEGATIVE
BLOOD URINE: ABNORMAL
BUN SERPL-MCNC: 9 MG/DL
CALCIUM SERPL-MCNC: 8.8 MG/DL
CAST: 0 /LPF
CHLORIDE SERPL-SCNC: 103 MMOL/L
CO2 SERPL-SCNC: 19 MMOL/L
COLOR: YELLOW
CREAT SERPL-MCNC: 0.67 MG/DL
CREAT SPEC-SCNC: 187 MG/DL
CREAT/PROT UR: 0.9 RATIO
EGFR: 122 ML/MIN/1.73M2
EOSINOPHIL # BLD AUTO: 0.16 K/UL
EOSINOPHIL NFR BLD AUTO: 1.5 %
EPITHELIAL CELLS: >36 /HPF
FERRITIN SERPL-MCNC: 13 NG/ML
GLUCOSE QUALITATIVE U: 100 MG/DL
GLUCOSE SERPL-MCNC: 83 MG/DL
HAPTOGLOB SERPL-MCNC: 186 MG/DL
HCT VFR BLD CALC: 32.3 %
HGB BLD-MCNC: 10 G/DL
IMM GRANULOCYTES NFR BLD AUTO: 0.6 %
IRON SATN MFR SERPL: 7 %
IRON SERPL-MCNC: 36 UG/DL
KETONES URINE: NEGATIVE MG/DL
LDH SERPL-CCNC: 181 U/L
LEUKOCYTE ESTERASE URINE: ABNORMAL
LYMPHOCYTES # BLD AUTO: 1.98 K/UL
LYMPHOCYTES NFR BLD AUTO: 18.8 %
MAGNESIUM SERPL-MCNC: 1.7 MG/DL
MAN DIFF?: NORMAL
MCHC RBC-ENTMCNC: 26.5 PG
MCHC RBC-ENTMCNC: 31 GM/DL
MCV RBC AUTO: 85.7 FL
MICROSCOPIC-UA: NORMAL
MONOCYTES # BLD AUTO: 0.69 K/UL
MONOCYTES NFR BLD AUTO: 6.5 %
NEUTROPHILS # BLD AUTO: 7.62 K/UL
NEUTROPHILS NFR BLD AUTO: 72.1 %
NITRITE URINE: NEGATIVE
PH URINE: 6
PHOSPHATE SERPL-MCNC: 3.3 MG/DL
PLATELET # BLD AUTO: 214 K/UL
POTASSIUM SERPL-SCNC: 3.9 MMOL/L
PROT SERPL-MCNC: 6.4 G/DL
PROT UR-MCNC: 161 MG/DL
PROTEIN URINE: 300 MG/DL
RBC # BLD: 3.77 M/UL
RBC # FLD: 14.7 %
RED BLOOD CELLS URINE: 14 /HPF
REVIEW: NORMAL
SODIUM SERPL-SCNC: 140 MMOL/L
SPECIFIC GRAVITY URINE: 1.03
TIBC SERPL-MCNC: 476 UG/DL
UIBC SERPL-MCNC: 440 UG/DL
URATE SERPL-MCNC: 5.1 MG/DL
UROBILINOGEN URINE: 0.2 MG/DL
WBC # FLD AUTO: 10.56 K/UL
WHITE BLOOD CELLS URINE: 14 /HPF

## 2024-04-01 ENCOUNTER — ASOB RESULT (OUTPATIENT)
Age: 29
End: 2024-04-01

## 2024-04-01 ENCOUNTER — APPOINTMENT (OUTPATIENT)
Dept: ANTEPARTUM | Facility: CLINIC | Age: 29
End: 2024-04-01
Payer: MEDICAID

## 2024-04-01 PROCEDURE — 76818 FETAL BIOPHYS PROFILE W/NST: CPT

## 2024-04-08 ENCOUNTER — ASOB RESULT (OUTPATIENT)
Age: 29
End: 2024-04-08

## 2024-04-08 ENCOUNTER — APPOINTMENT (OUTPATIENT)
Dept: ANTEPARTUM | Facility: CLINIC | Age: 29
End: 2024-04-08
Payer: MEDICAID

## 2024-04-08 PROCEDURE — 76818 FETAL BIOPHYS PROFILE W/NST: CPT | Mod: 59

## 2024-04-08 PROCEDURE — 76816 OB US FOLLOW-UP PER FETUS: CPT

## 2024-04-15 ENCOUNTER — APPOINTMENT (OUTPATIENT)
Dept: ANTEPARTUM | Facility: CLINIC | Age: 29
End: 2024-04-15
Payer: MEDICAID

## 2024-04-15 ENCOUNTER — ASOB RESULT (OUTPATIENT)
Age: 29
End: 2024-04-15

## 2024-04-15 ENCOUNTER — INPATIENT (INPATIENT)
Facility: HOSPITAL | Age: 29
LOS: 2 days | Discharge: HOME | End: 2024-04-18
Attending: SPECIALIST | Admitting: SPECIALIST

## 2024-04-15 ENCOUNTER — APPOINTMENT (OUTPATIENT)
Dept: ANTEPARTUM | Facility: CLINIC | Age: 29
End: 2024-04-15

## 2024-04-15 VITALS
TEMPERATURE: 98 F | HEART RATE: 104 BPM | SYSTOLIC BLOOD PRESSURE: 114 MMHG | RESPIRATION RATE: 16 BRPM | DIASTOLIC BLOOD PRESSURE: 63 MMHG

## 2024-04-15 DIAGNOSIS — O26.899 OTHER SPECIFIED PREGNANCY RELATED CONDITIONS, UNSPECIFIED TRIMESTER: ICD-10-CM

## 2024-04-15 DIAGNOSIS — Z98.89 OTHER SPECIFIED POSTPROCEDURAL STATES: Chronic | ICD-10-CM

## 2024-04-15 DIAGNOSIS — O41.00X0 OLIGOHYDRAMNIOS, UNSPECIFIED TRIMESTER, NOT APPLICABLE OR UNSPECIFIED: ICD-10-CM

## 2024-04-15 LAB
ALBUMIN SERPL ELPH-MCNC: 3.2 G/DL — LOW (ref 3.3–5)
ALP SERPL-CCNC: 141 U/L — HIGH (ref 40–120)
ALT FLD-CCNC: 14 U/L — SIGNIFICANT CHANGE UP (ref 4–33)
ANION GAP SERPL CALC-SCNC: 13 MMOL/L — SIGNIFICANT CHANGE UP (ref 7–14)
AST SERPL-CCNC: 17 U/L — SIGNIFICANT CHANGE UP (ref 4–32)
BASOPHILS # BLD AUTO: 0.04 K/UL — SIGNIFICANT CHANGE UP (ref 0–0.2)
BASOPHILS NFR BLD AUTO: 0.4 % — SIGNIFICANT CHANGE UP (ref 0–2)
BILIRUB SERPL-MCNC: <0.2 MG/DL — SIGNIFICANT CHANGE UP (ref 0.2–1.2)
BLD GP AB SCN SERPL QL: NEGATIVE — SIGNIFICANT CHANGE UP
BUN SERPL-MCNC: 11 MG/DL — SIGNIFICANT CHANGE UP (ref 7–23)
CALCIUM SERPL-MCNC: 8.8 MG/DL — SIGNIFICANT CHANGE UP (ref 8.4–10.5)
CHLORIDE SERPL-SCNC: 105 MMOL/L — SIGNIFICANT CHANGE UP (ref 98–107)
CO2 SERPL-SCNC: 21 MMOL/L — LOW (ref 22–31)
CREAT SERPL-MCNC: 0.61 MG/DL — SIGNIFICANT CHANGE UP (ref 0.5–1.3)
EGFR: 125 ML/MIN/1.73M2 — SIGNIFICANT CHANGE UP
EOSINOPHIL # BLD AUTO: 0.18 K/UL — SIGNIFICANT CHANGE UP (ref 0–0.5)
EOSINOPHIL NFR BLD AUTO: 1.8 % — SIGNIFICANT CHANGE UP (ref 0–6)
GLUCOSE SERPL-MCNC: 79 MG/DL — SIGNIFICANT CHANGE UP (ref 70–99)
HCT VFR BLD CALC: 29.2 % — LOW (ref 34.5–45)
HGB BLD-MCNC: 9.5 G/DL — LOW (ref 11.5–15.5)
IANC: 6.85 K/UL — SIGNIFICANT CHANGE UP (ref 1.8–7.4)
IMM GRANULOCYTES NFR BLD AUTO: 0.7 % — SIGNIFICANT CHANGE UP (ref 0–0.9)
LDH SERPL L TO P-CCNC: 214 U/L — SIGNIFICANT CHANGE UP (ref 135–225)
LYMPHOCYTES # BLD AUTO: 2.34 K/UL — SIGNIFICANT CHANGE UP (ref 1–3.3)
LYMPHOCYTES # BLD AUTO: 23.1 % — SIGNIFICANT CHANGE UP (ref 13–44)
MCHC RBC-ENTMCNC: 26.8 PG — LOW (ref 27–34)
MCHC RBC-ENTMCNC: 32.5 GM/DL — SIGNIFICANT CHANGE UP (ref 32–36)
MCV RBC AUTO: 82.3 FL — SIGNIFICANT CHANGE UP (ref 80–100)
MONOCYTES # BLD AUTO: 0.65 K/UL — SIGNIFICANT CHANGE UP (ref 0–0.9)
MONOCYTES NFR BLD AUTO: 6.4 % — SIGNIFICANT CHANGE UP (ref 2–14)
NEUTROPHILS # BLD AUTO: 6.85 K/UL — SIGNIFICANT CHANGE UP (ref 1.8–7.4)
NEUTROPHILS NFR BLD AUTO: 67.6 % — SIGNIFICANT CHANGE UP (ref 43–77)
NRBC # BLD: 0 /100 WBCS — SIGNIFICANT CHANGE UP (ref 0–0)
NRBC # FLD: 0 K/UL — SIGNIFICANT CHANGE UP (ref 0–0)
PLATELET # BLD AUTO: 214 K/UL — SIGNIFICANT CHANGE UP (ref 150–400)
POTASSIUM SERPL-MCNC: 3.7 MMOL/L — SIGNIFICANT CHANGE UP (ref 3.5–5.3)
POTASSIUM SERPL-SCNC: 3.7 MMOL/L — SIGNIFICANT CHANGE UP (ref 3.5–5.3)
PROT SERPL-MCNC: 6.4 G/DL — SIGNIFICANT CHANGE UP (ref 6–8.3)
RBC # BLD: 3.55 M/UL — LOW (ref 3.8–5.2)
RBC # FLD: 14.7 % — HIGH (ref 10.3–14.5)
RH IG SCN BLD-IMP: POSITIVE — SIGNIFICANT CHANGE UP
RH IG SCN BLD-IMP: POSITIVE — SIGNIFICANT CHANGE UP
SODIUM SERPL-SCNC: 139 MMOL/L — SIGNIFICANT CHANGE UP (ref 135–145)
URATE SERPL-MCNC: 5.6 MG/DL — SIGNIFICANT CHANGE UP (ref 2.5–7)
WBC # BLD: 10.13 K/UL — SIGNIFICANT CHANGE UP (ref 3.8–10.5)
WBC # FLD AUTO: 10.13 K/UL — SIGNIFICANT CHANGE UP (ref 3.8–10.5)

## 2024-04-15 PROCEDURE — 76818 FETAL BIOPHYS PROFILE W/NST: CPT

## 2024-04-15 RX ORDER — OXYTOCIN 10 UNIT/ML
2 VIAL (ML) INJECTION
Qty: 30 | Refills: 0 | Status: DISCONTINUED | OUTPATIENT
Start: 2024-04-15 | End: 2024-04-17

## 2024-04-15 RX ORDER — SODIUM CHLORIDE 9 MG/ML
1000 INJECTION, SOLUTION INTRAVENOUS
Refills: 0 | Status: DISCONTINUED | OUTPATIENT
Start: 2024-04-15 | End: 2024-04-16

## 2024-04-15 RX ORDER — CHLORHEXIDINE GLUCONATE 213 G/1000ML
1 SOLUTION TOPICAL DAILY
Refills: 0 | Status: DISCONTINUED | OUTPATIENT
Start: 2024-04-15 | End: 2024-04-16

## 2024-04-15 RX ORDER — OXYTOCIN 10 UNIT/ML
333.33 VIAL (ML) INJECTION
Qty: 20 | Refills: 0 | Status: DISCONTINUED | OUTPATIENT
Start: 2024-04-15 | End: 2024-04-17

## 2024-04-15 RX ADMIN — SODIUM CHLORIDE 125 MILLILITER(S): 9 INJECTION, SOLUTION INTRAVENOUS at 22:14

## 2024-04-15 RX ADMIN — CHLORHEXIDINE GLUCONATE 1 APPLICATION(S): 213 SOLUTION TOPICAL at 22:14

## 2024-04-15 NOTE — OB PROVIDER H&P - HISTORY OF PRESENT ILLNESS
Pt. is a 29y/o  EGA 39.2wks was sent in from the ATU d/t new diagnosis of oligohydramnios SANTA: 2.22. Pt. denies contractions, leakage of fluid, vaginal bleeding. Pt. reports good fetal movement.     AP: Proteinuria     PNC: Dr. Maryan Montes Hx:   [place] Syndrome - Nephrologist - Dr. Erica Molina    Asthma

## 2024-04-15 NOTE — OB RN PATIENT PROFILE - NS_OBGYNHISTORY_OBGYN_ALL_OB_FT
FT  16 wt 8lbs 15oz  FT  22   wt. 9lbs 3oz  TOP x1 w/ D&C FT  16 wt 8lbs 15oz  FT  22   wt. 9lbs 3oz  TOP x2 () w/ D&C

## 2024-04-15 NOTE — OB RN TRIAGE NOTE - NS_OBGYNHISTORY_OBGYN_ALL_OB_FT
FT  16 wt 8lbs 15oz  FT  22   wt. 9lbs 3oz FT  16 wt 8lbs 15oz  FT  22   wt. 9lbs 3oz  TOP x1 w/ D&C

## 2024-04-15 NOTE — OB PROVIDER H&P - NSHPPHYSICALEXAM_GEN_ALL_CORE
ICU Vital Signs Last 24 Hrs  T(C): 36.5 (15 Apr 2024 20:18), Max: 36.5 (15 Apr 2024 20:04)  T(F): 97.7 (15 Apr 2024 20:18), Max: 97.7 (15 Apr 2024 20:04)  HR: 90 (15 Apr 2024 20:26) (90 - 104)  BP: 111/60 (15 Apr 2024 20:26) (111/60 - 114/63)  BP(mean): --  ABP: --  ABP(mean): --  RR: 16 (15 Apr 2024 20:04) (16 - 16)  SpO2: --    Abdomen soft nontender  SVE: 2.5/50/-3  TAS: Cephalic presentation   GBS: Neg. (3/20/24)  EFW: 4300gms by leopolds   Category I/Reactive NST  Irregular contractions

## 2024-04-15 NOTE — OB PROVIDER H&P - ASSESSMENT
Evidence of oligohydramnios, discussed findings with Dr. Erickson   -Admit to L&D  -Routine and PEC labs as per MFM to assess kidney function   -Induce with vaginal Cytotec x1 dose then start Pitocin

## 2024-04-15 NOTE — OB PROVIDER H&P - NSOBVTERISKREFER_OBGYN_ALL_OB
[de-identified] : 66 y.o. female, PMHx insomnia, anxiety, recurrent UTIs, asthma, environmental allergies.  \par \par Not feeling right recently, weak/fatigued feeling in the afternoons the past few weeks.  Feeling otherwise very well.  No palpitations, chest pain, no dyspnea, no edema, no cough, no fever/chills, no night sweats, no rash, no GI symptoms, no  symptoms, no joint pain.  No recent travel.  \par Feels like she is sleeping better, about 6 hr/night with trazodone, occasional xanax.  H/o anxiety, OCD.  Continues f/u with a therapist.  Not feeling especially anxious or worried.  \par Very active, walks regularly on the treadmill. \par [place] a start of UTI yesterday, so started Macrobid that was prescribed previously to use as needed.  Thinks forgetting to use Estrace cream precipitated this UTI.  \par Will be traveling to [place] on 5/20/22.  \par 
Refer to the Assessment tab to view/cancel completed assessment.

## 2024-04-15 NOTE — OB PROVIDER H&P - NSLOWPPHRISK_OBGYN_A_OB
No previous uterine incision/[place] Pregnancy/Less than or equal to 4 previous vaginal births/No known bleeding disorder/No other PPH risks indicated

## 2024-04-15 NOTE — OB RN TRIAGE NOTE - FALL HARM RISK - UNIVERSAL INTERVENTIONS
Bed in lowest position, wheels locked, appropriate side rails in place/Call bell, personal items and telephone in reach/Instruct patient to call for assistance before getting out of bed or chair/Non-slip footwear when patient is out of bed/[place] to call system/Physically safe environment - no spills, clutter or unnecessary equipment/Purposeful Proactive Rounding/Room/bathroom lighting operational, light cord in reach

## 2024-04-16 ENCOUNTER — TRANSCRIPTION ENCOUNTER (OUTPATIENT)
Age: 29
End: 2024-04-16

## 2024-04-16 LAB
APPEARANCE UR: CLEAR — SIGNIFICANT CHANGE UP
BACTERIA # UR AUTO: NEGATIVE /HPF — SIGNIFICANT CHANGE UP
BILIRUB UR-MCNC: NEGATIVE — SIGNIFICANT CHANGE UP
CAST: 0 /LPF — SIGNIFICANT CHANGE UP (ref 0–4)
COLOR SPEC: YELLOW — SIGNIFICANT CHANGE UP
CREAT ?TM UR-MCNC: 43 MG/DL — SIGNIFICANT CHANGE UP
DIFF PNL FLD: ABNORMAL
GLUCOSE UR QL: NEGATIVE MG/DL — SIGNIFICANT CHANGE UP
KETONES UR-MCNC: NEGATIVE MG/DL — SIGNIFICANT CHANGE UP
LEUKOCYTE ESTERASE UR-ACNC: NEGATIVE — SIGNIFICANT CHANGE UP
NITRITE UR-MCNC: NEGATIVE — SIGNIFICANT CHANGE UP
PH UR: 7 — SIGNIFICANT CHANGE UP (ref 5–8)
PROT ?TM UR-MCNC: 14 MG/DL — SIGNIFICANT CHANGE UP
PROT UR-MCNC: SIGNIFICANT CHANGE UP MG/DL
PROT/CREAT UR-RTO: 0.3 RATIO — HIGH (ref 0–0.2)
RBC CASTS # UR COMP ASSIST: 4 /HPF — SIGNIFICANT CHANGE UP (ref 0–4)
REVIEW: SIGNIFICANT CHANGE UP
SP GR SPEC: 1.01 — SIGNIFICANT CHANGE UP (ref 1–1.03)
SQUAMOUS # UR AUTO: 2 /HPF — SIGNIFICANT CHANGE UP (ref 0–5)
T PALLIDUM AB TITR SER: NEGATIVE — SIGNIFICANT CHANGE UP
UROBILINOGEN FLD QL: 0.2 MG/DL — SIGNIFICANT CHANGE UP (ref 0.2–1)
WBC UR QL: 0 /HPF — SIGNIFICANT CHANGE UP (ref 0–5)

## 2024-04-16 RX ORDER — SODIUM CHLORIDE 9 MG/ML
3 INJECTION INTRAMUSCULAR; INTRAVENOUS; SUBCUTANEOUS EVERY 8 HOURS
Refills: 0 | Status: DISCONTINUED | OUTPATIENT
Start: 2024-04-16 | End: 2024-04-18

## 2024-04-16 RX ORDER — DIPHENHYDRAMINE HCL 50 MG
25 CAPSULE ORAL ONCE
Refills: 0 | Status: COMPLETED | OUTPATIENT
Start: 2024-04-16 | End: 2024-04-16

## 2024-04-16 RX ORDER — LANOLIN
1 OINTMENT (GRAM) TOPICAL EVERY 6 HOURS
Refills: 0 | Status: DISCONTINUED | OUTPATIENT
Start: 2024-04-16 | End: 2024-04-18

## 2024-04-16 RX ORDER — IBUPROFEN 200 MG
600 TABLET ORAL EVERY 6 HOURS
Refills: 0 | Status: DISCONTINUED | OUTPATIENT
Start: 2024-04-16 | End: 2024-04-18

## 2024-04-16 RX ORDER — TETANUS TOXOID, REDUCED DIPHTHERIA TOXOID AND ACELLULAR PERTUSSIS VACCINE, ADSORBED 5; 2.5; 8; 8; 2.5 [IU]/.5ML; [IU]/.5ML; UG/.5ML; UG/.5ML; UG/.5ML
0.5 SUSPENSION INTRAMUSCULAR ONCE
Refills: 0 | Status: DISCONTINUED | OUTPATIENT
Start: 2024-04-16 | End: 2024-04-18

## 2024-04-16 RX ORDER — AER TRAVELER 0.5 G/1
1 SOLUTION RECTAL; TOPICAL EVERY 4 HOURS
Refills: 0 | Status: DISCONTINUED | OUTPATIENT
Start: 2024-04-16 | End: 2024-04-18

## 2024-04-16 RX ORDER — OXYCODONE HYDROCHLORIDE 5 MG/1
5 TABLET ORAL ONCE
Refills: 0 | Status: DISCONTINUED | OUTPATIENT
Start: 2024-04-16 | End: 2024-04-18

## 2024-04-16 RX ORDER — SIMETHICONE 80 MG/1
80 TABLET, CHEWABLE ORAL EVERY 4 HOURS
Refills: 0 | Status: DISCONTINUED | OUTPATIENT
Start: 2024-04-16 | End: 2024-04-18

## 2024-04-16 RX ORDER — KETOROLAC TROMETHAMINE 30 MG/ML
30 SYRINGE (ML) INJECTION ONCE
Refills: 0 | Status: DISCONTINUED | OUTPATIENT
Start: 2024-04-16 | End: 2024-04-17

## 2024-04-16 RX ORDER — BENZOCAINE 10 %
1 GEL (GRAM) MUCOUS MEMBRANE EVERY 6 HOURS
Refills: 0 | Status: DISCONTINUED | OUTPATIENT
Start: 2024-04-16 | End: 2024-04-18

## 2024-04-16 RX ORDER — IBUPROFEN 200 MG
600 TABLET ORAL EVERY 6 HOURS
Refills: 0 | Status: COMPLETED | OUTPATIENT
Start: 2024-04-16 | End: 2025-03-15

## 2024-04-16 RX ORDER — OXYTOCIN 10 UNIT/ML
10 VIAL (ML) INJECTION ONCE
Refills: 0 | Status: DISCONTINUED | OUTPATIENT
Start: 2024-04-16 | End: 2024-04-16

## 2024-04-16 RX ORDER — ACETAMINOPHEN 500 MG
975 TABLET ORAL
Refills: 0 | Status: DISCONTINUED | OUTPATIENT
Start: 2024-04-16 | End: 2024-04-18

## 2024-04-16 RX ORDER — MAGNESIUM HYDROXIDE 400 MG/1
30 TABLET, CHEWABLE ORAL
Refills: 0 | Status: DISCONTINUED | OUTPATIENT
Start: 2024-04-16 | End: 2024-04-18

## 2024-04-16 RX ORDER — ONDANSETRON 8 MG/1
4 TABLET, FILM COATED ORAL ONCE
Refills: 0 | Status: COMPLETED | OUTPATIENT
Start: 2024-04-16 | End: 2024-04-16

## 2024-04-16 RX ORDER — OXYTOCIN 10 UNIT/ML
20 VIAL (ML) INJECTION ONCE
Refills: 0 | Status: COMPLETED | OUTPATIENT
Start: 2024-04-16 | End: 2024-04-16

## 2024-04-16 RX ORDER — OXYCODONE HYDROCHLORIDE 5 MG/1
5 TABLET ORAL
Refills: 0 | Status: DISCONTINUED | OUTPATIENT
Start: 2024-04-16 | End: 2024-04-18

## 2024-04-16 RX ORDER — DIPHENHYDRAMINE HCL 50 MG
25 CAPSULE ORAL EVERY 6 HOURS
Refills: 0 | Status: DISCONTINUED | OUTPATIENT
Start: 2024-04-16 | End: 2024-04-18

## 2024-04-16 RX ORDER — PRAMOXINE HYDROCHLORIDE 150 MG/15G
1 AEROSOL, FOAM RECTAL EVERY 4 HOURS
Refills: 0 | Status: DISCONTINUED | OUTPATIENT
Start: 2024-04-16 | End: 2024-04-18

## 2024-04-16 RX ORDER — HYDROCORTISONE 1 %
1 OINTMENT (GRAM) TOPICAL EVERY 6 HOURS
Refills: 0 | Status: DISCONTINUED | OUTPATIENT
Start: 2024-04-16 | End: 2024-04-18

## 2024-04-16 RX ORDER — DIBUCAINE 1 %
1 OINTMENT (GRAM) RECTAL EVERY 6 HOURS
Refills: 0 | Status: DISCONTINUED | OUTPATIENT
Start: 2024-04-16 | End: 2024-04-18

## 2024-04-16 RX ORDER — OXYTOCIN 10 UNIT/ML
41.67 VIAL (ML) INJECTION
Qty: 20 | Refills: 0 | Status: DISCONTINUED | OUTPATIENT
Start: 2024-04-16 | End: 2024-04-17

## 2024-04-16 RX ADMIN — Medication 0.2 MILLIGRAM(S): at 15:13

## 2024-04-16 RX ADMIN — Medication 975 MILLIGRAM(S): at 20:48

## 2024-04-16 RX ADMIN — SODIUM CHLORIDE 3 MILLILITER(S): 9 INJECTION INTRAMUSCULAR; INTRAVENOUS; SUBCUTANEOUS at 23:19

## 2024-04-16 RX ADMIN — Medication 25 MILLIGRAM(S): at 12:42

## 2024-04-16 RX ADMIN — Medication 2 MILLIUNIT(S)/MIN: at 15:11

## 2024-04-16 RX ADMIN — Medication 2 MILLIUNIT(S)/MIN: at 01:56

## 2024-04-16 RX ADMIN — Medication 20 UNIT(S): at 17:12

## 2024-04-16 RX ADMIN — ONDANSETRON 4 MILLIGRAM(S): 8 TABLET, FILM COATED ORAL at 11:30

## 2024-04-16 RX ADMIN — Medication 0.2 MILLIGRAM(S): at 19:54

## 2024-04-16 RX ADMIN — Medication 975 MILLIGRAM(S): at 21:18

## 2024-04-16 NOTE — OB PROVIDER LABOR PROGRESS NOTE - NS_SUBJECTIVE/OBJECTIVE_OBGYN_ALL_OB_FT
Patient complaining of pressure
R1 Labor & Delivery Progress Note       Pt seen & examined at bedside for assessment of late decelerations.       T(C): 36.5 (04-16-24 @ 07:18), Max: 36.9 (04-15-24 @ 21:38)  HR: 95 (04-16-24 @ 09:29) (70 - 104)  BP: 93/55 (04-16-24 @ 09:29) (78/52 - 126/75)  RR: 15 (04-16-24 @ 07:18) (14 - 19)  SpO2: 100% (04-16-24 @ 09:29) (83% - 100%)
Patient examined to assess cervical dilation. Epidural in place and effective. Patient c/o mild intermittent vaginal pressure. Pitocin infusing @ 4mu/min    Vital Signs Last 24 Hrs  T(C): 36.5 (16 Apr 2024 07:18), Max: 36.9 (15 Apr 2024 21:38)  T(F): 97.7 (16 Apr 2024 07:18), Max: 98.4 (15 Apr 2024 21:38)  HR: 81 (16 Apr 2024 11:09) (68 - 104)  BP: 108/67 (16 Apr 2024 10:59) (78/52 - 126/75)  RR: 15 (16 Apr 2024 07:18) (14 - 19)  SpO2: 85% (16 Apr 2024 11:05) (83% - 100%)
Patient seen and examined for AROM. S/p epidural placement. Patient comfortable without complaints.  VS  T(C): 36.4 (04-16-24 @ 02:30)  HR: 85 (04-16-24 @ 04:43)  BP: 105/64 (04-16-24 @ 04:36)  RR: 16 (04-16-24 @ 02:30)  SpO2: 98% (04-16-24 @ 04:43)
Labor & Delivery Progress Note     Pt seen & examined at bedside for complaints of rectal pressure    T(C): 36.4 (04-16-24 @ 12:36), Max: 36.9 (04-15-24 @ 21:38)  HR: 87 (04-16-24 @ 13:42) (68 - 116)  BP: 129/70 (04-16-24 @ 13:30) (78/52 - 144/68)  RR: 20 (04-16-24 @ 12:36) (14 - 20)  SpO2: 91% (04-16-24 @ 13:42) (83% - 100%)

## 2024-04-16 NOTE — OB PROVIDER LABOR PROGRESS NOTE - NS_OBIHIFHRDETAILS_OBGYN_ALL_OB_FT
EFM: 135/mod. variability/+accels/-decels  [place]: q1-2 min
120 mod naresh/+accels/-decels
120/mod naresh/occ accels/occ variables - cat 2
EFM: 120s/mod. variability/+accels/+ late decels
130 bpm, moderate variability, +accels, intermittent early decels  ISE in place

## 2024-04-16 NOTE — DISCHARGE NOTE OB - NS MD DC FALL RISK RISK
For information on Fall & Injury Prevention, visit: [email or web address] OR  [email or web address] OR  https://www.cdc.gov/steadi/patient.html

## 2024-04-16 NOTE — OB RN DELIVERY SUMMARY - NS_SEPSISRSKCALC_OBGYN_ALL_OB_FT
EOS calculated successfully. EOS Risk Factor: 0.5/1000 live births ([facility] national incidence); GA=39w3d; Temp=98.4; ROM=10.25; GBS='Negative'; Antibiotics='No antibiotics or any antibiotics < 2 hrs prior to birth'

## 2024-04-16 NOTE — DISCHARGE NOTE OB - CARE PLAN
1 Principal Discharge DX:	 (spontaneous vaginal delivery)  Assessment and plan of treatment:	f/u 2 weeks

## 2024-04-16 NOTE — OB PROVIDER LABOR PROGRESS NOTE - ASSESSMENT
Plan: 28y y/o @ 39w3d in stable condition.   - ISE placed without issue  - fluid bolus started  - Con't IOL, pit on 4. Will consider reducing if tracing worsens  - Continuous EFM, [place]  - Con't IVF    d/w Dr. Khan PGY4  Harley Bridges MD, PGY1
28 y.o  @ 39w3d IOL for Oligo, making cervical change    -patient repositioned with peanut ball  -continue pitocin  -For repeat VE when patient has increased pressure or PRN    d/w Dr. Raghavendra Black NP
Plan:  28y P0  IOL for oligohydramnios  - patient w/ small amount of anterior lip of cervix remaining  - can bear down as needed    Plan per Dr. Raghavendra Llanes PGY-1
Patient desires epidural bolus  continue pitocin  all q's answered 
 @39.3wks Oligo  AROM clear without incident  Cont with pitcon  Cont EFM/TOCO  Will reassess PRN    carlos Bonilla NP

## 2024-04-16 NOTE — DISCHARGE NOTE OB - CARE PROVIDER_API CALL
Joel Steinberg.  Obstetrics and Gynecology  [address] [place] [address]  Phone: [phone]  Fax: [phone]  Follow Up Time:

## 2024-04-16 NOTE — OB NEONATOLOGY/PEDIATRICIAN DELIVERY SUMMARY - NSPEDSNEONOTESA_OBGYN_ALL_OB_FT
Male infant born LGA at 39 3/7 wks via  to a 29 y/o  blood type O+ mother. No significant maternal or prenatal history. Prenatal labs neg/NR/immune, GBS - on 3/22. AROM at 0437 on  with clear fluids. EOS score 0.1, highest maternal temperature 36.8. Baby emerged vigorous, crying. Infant was brought to radiant warmer and w/d/s/s. HR>100, initially normal respiratory effort. APGARS of 8/9. Pediatrician called after delivery for grunting with normal sats at 20MOL. At that time, baby was started on CPAP 5/21% for 10 mins but unable to wean to RA due to persistent grunting. Restarted CPAP and increased to 6/21% for 15 minutes after which baby successfully transitioned to RA. Baby placed skin-to-skin. Mom is initiating breast feeding/formula feeding. Consents to Hepatitis B vaccination.    BW: 4480g  : 24  TOB: 14:52 Male infant born LGA at 39 3/7 wks via  to a 27 y/o  blood type O+ mother. No significant maternal or prenatal history. Prenatal labs neg/NR/immune, GBS - on 3/22. AROM at 0437 on  with clear fluids. EOS score 0.1, highest maternal temperature 36.8. Baby emerged vigorous, crying, terminal mec. Infant was brought to radiant warmer and w/d/s/s. HR>100, initially normal respiratory effort. APGARS of 8/9. Pediatrician called after delivery for grunting with normal sats at 20MOL. At that time, baby was started on CPAP 5/21% for 10 mins but unable to wean to RA due to persistent grunting. Restarted CPAP and increased to 6/21% for 15 minutes after which baby successfully transitioned to RA. Baby placed skin-to-skin. Mom is initiating breast feeding/formula feeding. Consents to Hepatitis B vaccination.    BW: 4480g  : 24  TOB: 14:52

## 2024-04-16 NOTE — OB PROVIDER DELIVERY SUMMARY - NSPROVIDERDELIVERYNOTE_OBGYN_ALL_OB_FT
Spontaneous vaginal delivery of liveborn male infant from CAM position. Head, shoulders, and body delivered easily. Infant was suctioned. Terminal mec. Delayed cord clamping and infant was passed to mother. Cord clamped and cut. Placenta delivered intact with a 3 vessel cord. IV Pitocin started. Fundal massage was given and uterine fundus was found to be firm. Bimanual exam revealed atonic lower uterine segment. Brisk bleeding noted from uterus. Rectal Cytotec, IM Methergine and Additional Pitocin given. Uterus then found to be firm. Vaginal exam revealed an intact cervix, vaginal walls and sulci. Patient had no laceration in the perineum. Good hemostasis was noted. Patient was stable and went to recovery. Count was correct x 2.    EBL: 526    Attending: Dr. Raghavendra Llanes PGY1

## 2024-04-16 NOTE — OB PROVIDER DELIVERY SUMMARY - NSSELHIDDEN_OBGYN_ALL_OB_FT
Refill authorized per protocol.     [NS_DeliveryAttending1_OBGYN_ALL_OB_FT:[id number]=],[NS_DeliveryAssist1_OBGYN_ALL_OB_FT:[id number]=]

## 2024-04-16 NOTE — DISCHARGE NOTE OB - PATIENT PORTAL LINK FT
You can access the FollowMyHealth Patient Portal offered by [facility] by registering at the following website: [email or web address]. By joining [employer] [facility] [employer] [facility]’s FollowMyHealth portal, you will also be able to view your health information using other applications (apps) compatible with our system.

## 2024-04-16 NOTE — OB RN DELIVERY SUMMARY - NSSELHIDDEN_OBGYN_ALL_OB_FT
[NS_DeliveryAttending1_OBGYN_ALL_OB_FT:[id number]],[NS_DeliveryAssist1_OBGYN_ALL_OB_FT:[id number]],[NS_DeliveryRN_OBGYN_ALL_OB_FT:[id number]]

## 2024-04-16 NOTE — OB RN DELIVERY SUMMARY - [facility]_OB_NU
PHYSICAL THERAPY - DAILY TREATMENT NOTE    Patient Name: Marsha Alvares        Date: 2018  : 1991   YES Patient  Verified  Visit #:   36   of   40  Insurance: Payor: [employer] / Plan: Bina Alberts / Product Type:  /      In time: 553 Out time: 438   Total Treatment Time: 61     Medicare Time Tracking (below)   Total Timed Codes (min):  na 1:1 Treatment Time:  na     TREATMENT AREA =  Left knee pain [M25.562]    SUBJECTIVE  Pain Level (on 0 to 10 scale):  0  / 10   Medication Changes/New allergies or changes in medical history, any new surgeries or procedures? NO    If yes, update Summary List   Subjective Functional Status/Changes:  []  No changes reported     Patient reports her knee is doing well, denies complications since her LV. Patient states she has been exercising her knee regularly without complication. OBJECTIVE  Modalities Rationale:     decrease inflammation and decrease pain to improve patient's ability to perform work activities.     min [] Estim, type/location:                                      []  att     []  unatt     []  w/US     []  w/ice    []  w/heat    min []  Mechanical Traction: type/lbs                   []  pro   []  sup   []  int   []  cont    []  before manual    []  after manual    min []  Ultrasound, settings/location:      min []  Iontophoresis w/ dexamethasone, location:                                               []  take home patch       []  in clinic   10 min [x]  Ice     []  Heat    location/position: To L knee in long sit    min []  Vasopneumatic Device, press/temp:     min []  Other:    [x] Skin assessment post-treatment (if applicable):    [x]  intact    []  redness- no adverse reaction     []redness - adverse reaction:        26 min Therapeutic Exercise:  [x]  See flow sheet   Rationale:      increase strength, improve coordination, improve balance and increase proprioception to improve the patients ability to perform recreational activities. 25 min Therapeutic Activity: plyometrics and power exercises   Rationale:    increase strength, improve coordination, improve balance and increase proprioception to improve the patients ability to perform athletic activities. min Patient Education:  YES  Reviewed HEP   []  Progressed/Changed HEP based on: Other Objective/Functional Measures:    Plyo: DL to DL depth jump from 12\" and 24\" to high knees, 3x4 for both; yesica rabbit jump 4x; SL diagonal jump 3x10 yards; sled pull to turn and run with 25#  Patient able to perform 3x8 SL squat to bench with 5# counterweight with proper mechanics     Post Treatment Pain Level (on 0 to 10) scale:   0  / 10     ASSESSMENT  Assessment/Changes in Function:     Patient tolerated session well. Plan to transition to DC to HEP [place]. Will assess athletic, return to sport testing at [facility].      []  See Progress Note/Recertification   Patient will continue to benefit from skilled PT services to modify and progress therapeutic interventions, address functional mobility deficits, address ROM deficits, address strength deficits, analyze and address soft tissue restrictions, analyze and cue movement patterns, analyze and modify body mechanics/ergonomics and assess and modify postural abnormalities to attain remaining goals.    Progress toward goals / Updated goals:    DC to HEP at [address]  [x]  Upgrade activities as tolerated YES Continue plan of care   []  Discharge due to :    []  Other:      Therapist: Vivi Obregon PT    Date: 7/24/2018 Time: 6:45 PM     Future Appointments  Date Time Provider Candido Mccarty   7/30/2018 10:00 AM Vivi Obregon PT [facility] 1

## 2024-04-17 LAB
HCT VFR BLD CALC: 27.8 % — LOW (ref 34.5–45)
HGB BLD-MCNC: 8.6 G/DL — LOW (ref 11.5–15.5)

## 2024-04-17 RX ORDER — FERROUS SULFATE 325(65) MG
325 TABLET ORAL THREE TIMES A DAY
Refills: 0 | Status: DISCONTINUED | OUTPATIENT
Start: 2024-04-17 | End: 2024-04-18

## 2024-04-17 RX ORDER — SENNA PLUS 8.6 MG/1
2 TABLET ORAL AT BEDTIME
Refills: 0 | Status: DISCONTINUED | OUTPATIENT
Start: 2024-04-17 | End: 2024-04-18

## 2024-04-17 RX ORDER — ASCORBIC ACID 60 MG
500 TABLET,CHEWABLE ORAL DAILY
Refills: 0 | Status: DISCONTINUED | OUTPATIENT
Start: 2024-04-17 | End: 2024-04-18

## 2024-04-17 RX ADMIN — Medication 0.2 MILLIGRAM(S): at 00:02

## 2024-04-17 RX ADMIN — Medication 0.2 MILLIGRAM(S): at 08:26

## 2024-04-17 RX ADMIN — Medication 600 MILLIGRAM(S): at 00:02

## 2024-04-17 RX ADMIN — Medication 600 MILLIGRAM(S): at 00:34

## 2024-04-17 RX ADMIN — Medication 0.2 MILLIGRAM(S): at 16:04

## 2024-04-17 RX ADMIN — SODIUM CHLORIDE 3 MILLILITER(S): 9 INJECTION INTRAMUSCULAR; INTRAVENOUS; SUBCUTANEOUS at 12:24

## 2024-04-17 RX ADMIN — Medication 325 MILLIGRAM(S): at 21:41

## 2024-04-17 RX ADMIN — SODIUM CHLORIDE 3 MILLILITER(S): 9 INJECTION INTRAMUSCULAR; INTRAVENOUS; SUBCUTANEOUS at 07:00

## 2024-04-17 RX ADMIN — Medication 975 MILLIGRAM(S): at 22:11

## 2024-04-17 RX ADMIN — SODIUM CHLORIDE 3 MILLILITER(S): 9 INJECTION INTRAMUSCULAR; INTRAVENOUS; SUBCUTANEOUS at 20:35

## 2024-04-17 RX ADMIN — Medication 0.2 MILLIGRAM(S): at 12:16

## 2024-04-17 RX ADMIN — Medication 975 MILLIGRAM(S): at 16:04

## 2024-04-17 RX ADMIN — Medication 600 MILLIGRAM(S): at 12:16

## 2024-04-17 RX ADMIN — Medication 600 MILLIGRAM(S): at 05:47

## 2024-04-17 RX ADMIN — Medication 0.2 MILLIGRAM(S): at 04:55

## 2024-04-17 RX ADMIN — Medication 975 MILLIGRAM(S): at 02:29

## 2024-04-17 RX ADMIN — Medication 600 MILLIGRAM(S): at 13:02

## 2024-04-17 RX ADMIN — Medication 600 MILLIGRAM(S): at 18:00

## 2024-04-17 RX ADMIN — Medication 975 MILLIGRAM(S): at 03:00

## 2024-04-17 RX ADMIN — Medication 600 MILLIGRAM(S): at 06:34

## 2024-04-17 RX ADMIN — Medication 975 MILLIGRAM(S): at 21:41

## 2024-04-17 RX ADMIN — Medication 600 MILLIGRAM(S): at 18:31

## 2024-04-17 RX ADMIN — Medication 975 MILLIGRAM(S): at 17:00

## 2024-04-18 VITALS
DIASTOLIC BLOOD PRESSURE: 72 MMHG | SYSTOLIC BLOOD PRESSURE: 109 MMHG | TEMPERATURE: 98 F | OXYGEN SATURATION: 100 % | RESPIRATION RATE: 18 BRPM | HEART RATE: 71 BPM

## 2024-04-18 RX ADMIN — SODIUM CHLORIDE 3 MILLILITER(S): 9 INJECTION INTRAMUSCULAR; INTRAVENOUS; SUBCUTANEOUS at 07:10

## 2024-04-18 RX ADMIN — Medication 600 MILLIGRAM(S): at 12:30

## 2024-04-18 RX ADMIN — Medication 600 MILLIGRAM(S): at 11:37

## 2024-04-18 RX ADMIN — Medication 600 MILLIGRAM(S): at 01:45

## 2024-04-18 RX ADMIN — Medication 600 MILLIGRAM(S): at 01:15

## 2024-04-18 NOTE — PROGRESS NOTE ADULT - SUBJECTIVE AND OBJECTIVE BOX
PPD#2- ATTENDING NOTE    S: Patient doing well. Minimal lochia. Pain controlled.    O: Vital Signs Last 24 Hrs  T(C): 36.4 (18 Apr 2024 05:31), Max: 37.1 (17 Apr 2024 17:25)  T(F): 97.6 (18 Apr 2024 05:31), Max: 98.8 (17 Apr 2024 17:25)  HR: 71 (18 Apr 2024 05:31) (66 - 71)  BP: 109/72 (18 Apr 2024 05:31) (98/62 - 109/72)  BP(mean): --  RR: 18 (18 Apr 2024 05:31) (18 - 18)  SpO2: 100% (18 Apr 2024 05:31) (99% - 100%)    Parameters below as of 18 Apr 2024 05:31  Patient On (Oxygen Delivery Method): room air      I&O's Summary        Gen: NAD  Abd: soft, NT, ND, fundus firm below umbilicus  Lochia: moderate  Ext: no tenderness, no hyper reflexia    Labs:                        8.6    x     )-----------( x        ( 17 Apr 2024 06:32 )             27.8             acetaminophen     Tablet .. 975 milliGRAM(s) Oral <User Schedule>  ascorbic acid 500 milliGRAM(s) Oral daily  benzocaine 20%/menthol 0.5% Spray 1 Spray(s) Topical every 6 hours PRN  dibucaine 1% Ointment 1 Application(s) Topical every 6 hours PRN  diphenhydrAMINE 25 milliGRAM(s) Oral every 6 hours PRN  diphtheria/tetanus/pertussis (acellular) Vaccine (Adacel) 0.5 milliLiter(s) IntraMuscular once  ferrous    sulfate 325 milliGRAM(s) Oral three times a day  hydrocortisone 1% Cream 1 Application(s) Topical every 6 hours PRN  ibuprofen  Tablet. 600 milliGRAM(s) Oral every 6 hours  lanolin Ointment 1 Application(s) Topical every 6 hours PRN  magnesium hydroxide Suspension 30 milliLiter(s) Oral two times a day PRN  oxyCODONE    IR 5 milliGRAM(s) Oral every 3 hours PRN  oxyCODONE    IR 5 milliGRAM(s) Oral once PRN  pramoxine 1%/zinc 5% Cream 1 Application(s) Topical every 4 hours PRN  prenatal multivitamin 1 Tablet(s) Oral daily  senna 2 Tablet(s) Oral at bedtime  simethicone 80 milliGRAM(s) Chew every 4 hours PRN  sodium chloride 0.9% lock flush 3 milliLiter(s) IV Push every 8 hours  witch hazel Pads 1 Application(s) Topical every 4 hours PRN      
SUBJECTIVE:    Pain: Controlled    Complaints: None    MILESTONES:     Alert and oriented x 3  [x  ]    Out of bed /ambulating [  x]      Voiding [x  ]  Due to void [  ]    Tolerating a regular diet.    Infant feeding:   Breast [  ]   Bottle [ X ]     Both [  ]                           Feeding related issues or concerns:    Objective   T(C): 36.7 (24 @ 06:07), Max: 37.6 (24 @ 18:58)  HR: 67 (24 @ 06:07) (67 - 128)  BP: 99/64 (24 @ 06:07) (92/53 - 184/144)  RR: 18 (24 @ 06:07) (17 - 20)  SpO2: 99% (24 @ 06:07) (83% - 100%)  Wt(kg): --                        8.6    x     )-----------( x        ( 2024 06:32 )             27.8         Blood Type: O Positive    RPR: Negative          MEDICATIONS  (STANDING):  acetaminophen     Tablet .. 975 milliGRAM(s) Oral <User Schedule>  diphtheria/tetanus/pertussis (acellular) Vaccine (Adacel) 0.5 milliLiter(s) IntraMuscular once  ibuprofen  Tablet. 600 milliGRAM(s) Oral every 6 hours  methylergonovine 0.2 milliGRAM(s) Oral every 4 hours  prenatal multivitamin 1 Tablet(s) Oral daily  sodium chloride 0.9% lock flush 3 milliLiter(s) IV Push every 8 hours    MEDICATIONS  (PRN):  benzocaine 20%/menthol 0.5% Spray 1 Spray(s) Topical every 6 hours PRN for Perineal discomfort  dibucaine 1% Ointment 1 Application(s) Topical every 6 hours PRN Perineal discomfort  diphenhydrAMINE 25 milliGRAM(s) Oral every 6 hours PRN Pruritus  hydrocortisone 1% Cream 1 Application(s) Topical every 6 hours PRN Moderate Pain (4-6)  lanolin Ointment 1 Application(s) Topical every 6 hours PRN nipple soreness  magnesium hydroxide Suspension 30 milliLiter(s) Oral two times a day PRN Constipation  oxyCODONE    IR 5 milliGRAM(s) Oral every 3 hours PRN Moderate to Severe Pain (4-10)  oxyCODONE    IR 5 milliGRAM(s) Oral once PRN Moderate to Severe Pain (4-10)  pramoxine 1%/zinc 5% Cream 1 Application(s) Topical every 4 hours PRN Moderate Pain (4-6)  simethicone 80 milliGRAM(s) Chew every 4 hours PRN Gas  witch hazel Pads 1 Application(s) Topical every 4 hours PRN Perineal discomfort      ASSESSMENT:      28y      G 4   P  3013      PP Day # 1      Delivery:   [ X ]             [   ]      QBL - 526      S/P  Cytotec, Methergine IM, Methergine series and extra Pitocin     Assisted delivery  :    Vacuum   [  ]   Forceps)  [  ]    Indication for assisted delivery: Tracing Abn [  ]     Maternal Exhaustion [  ]     Other [  ]    Past medical history significant for HPI:  Pt. is a 29y/o  EGA 39.2wks was sent in from the [facility] d/t new diagnosis of oligohydramnios SANTA: 2.22. Pt. denies contractions, leakage of fluid, vaginal bleeding. Pt. reports good fetal movement.     AP: Proteinuria     PNC: Dr. 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    Nipples:    Abdomen: Soft [ x ]  Nontender [  ]  Nondistended [  ]   Distended [  ]    Vagina: Lochia   Mod [x  ]      Scant [  ]  Heavy  [  ]    Perineum:  Intact [  X]   Laceration   [  ]   Type of laceration [          ]    Episiotomy [  ]    Type of episiotomy  [              ]    Lower extremities: Edema  [  ] noted bilaterally .  Nontender Francis  [  ]  Negative Janice's sign [  ] Positive pedal pulses [  ]    Other relevant physical exam findings;       PLAN:    Plan: Increase ambulation, analgesia PRN and pain medication protocol standing oxycodone, ibuprofen and acetaminophen.    Diet: Continue regular diet    Labs:    Continue routine postpartum care.  For follow up with her Nephrologist as an outpatient .    Discharge Planning [x  ]    For  Discharge Today  [   ]    Consults :  Social Work [  ]     Lactation [x  ]    Other [      ]
Post partum Day #1      Pt without complaints  vital signs stable      Abdomen soft  fundus firm, non tender  extremities non tender    lochia wnl      Patient doing well  Routine post partum care  Discharge for PP day #2

## 2024-04-18 NOTE — PROGRESS NOTE ADULT - ASSESSMENT
A: 28y PPD#2 s/p  doing well.    Plan:  Analgesia prn  Regular diet  Discharge instructions given  F/U 6 weeks

## 2025-01-08 ENCOUNTER — NON-APPOINTMENT (OUTPATIENT)
Age: 30
End: 2025-01-08

## 2025-01-08 ENCOUNTER — APPOINTMENT (OUTPATIENT)
Facility: CLINIC | Age: 30
End: 2025-01-08

## 2025-05-09 ENCOUNTER — APPOINTMENT (OUTPATIENT)
Facility: CLINIC | Age: 30
End: 2025-05-09

## 2025-05-19 ENCOUNTER — LABORATORY RESULT (OUTPATIENT)
Age: 30
End: 2025-05-19

## 2025-05-19 ENCOUNTER — ASOB RESULT (OUTPATIENT)
Age: 30
End: 2025-05-19

## 2025-05-19 ENCOUNTER — APPOINTMENT (OUTPATIENT)
Facility: CLINIC | Age: 30
End: 2025-05-19
Payer: MEDICAID

## 2025-05-19 VITALS
HEIGHT: 61 IN | BODY MASS INDEX: 33.42 KG/M2 | WEIGHT: 177 LBS | SYSTOLIC BLOOD PRESSURE: 103 MMHG | DIASTOLIC BLOOD PRESSURE: 64 MMHG

## 2025-05-19 DIAGNOSIS — Z01.411 ENCOUNTER FOR GYNECOLOGICAL EXAMINATION (GENERAL) (ROUTINE) WITH ABNORMAL FINDINGS: ICD-10-CM

## 2025-05-19 DIAGNOSIS — N83.11 CORPUS LUTEUM CYST OF RIGHT OVARY: ICD-10-CM

## 2025-05-19 DIAGNOSIS — N91.2 AMENORRHEA, UNSPECIFIED: ICD-10-CM

## 2025-05-19 LAB — HCG UR QL: POSITIVE

## 2025-05-19 PROCEDURE — 99214 OFFICE O/P EST MOD 30 MIN: CPT | Mod: 25

## 2025-05-19 PROCEDURE — 81025 URINE PREGNANCY TEST: CPT

## 2025-05-19 PROCEDURE — 99459 PELVIC EXAMINATION: CPT

## 2025-05-19 PROCEDURE — G0444 DEPRESSION SCREEN ANNUAL: CPT | Mod: 59

## 2025-05-19 PROCEDURE — 76830 TRANSVAGINAL US NON-OB: CPT

## 2025-05-19 PROCEDURE — 76376 3D RENDER W/INTRP POSTPROCES: CPT

## 2025-05-19 PROCEDURE — 99395 PREV VISIT EST AGE 18-39: CPT

## 2025-05-19 PROCEDURE — 76856 US EXAM PELVIC COMPLETE: CPT

## 2025-05-21 LAB
BACTERIA UR CULT: NORMAL
C TRACH RRNA SPEC QL NAA+PROBE: DETECTED
N GONORRHOEA RRNA SPEC QL NAA+PROBE: NOT DETECTED
SOURCE AMPLIFICATION: NORMAL

## 2025-05-21 RX ORDER — AZITHROMYCIN 500 MG/1
500 TABLET, FILM COATED ORAL ONCE
Qty: 2 | Refills: 0 | Status: ACTIVE | COMMUNITY
Start: 2025-05-21 | End: 1900-01-01

## 2025-05-23 RX ORDER — ASPIRIN 81 MG/1
81 TABLET, CHEWABLE ORAL DAILY
Qty: 60 | Refills: 3 | Status: ACTIVE | COMMUNITY
Start: 2025-05-23 | End: 1900-01-01

## 2025-05-23 RX ORDER — PRENATAL VIT 116/IRON/FA/DHA 28-800-200
28-0.8-235 CAPSULE ORAL DAILY
Qty: 90 | Refills: 1 | Status: ACTIVE | COMMUNITY
Start: 2025-05-23 | End: 1900-01-01

## 2025-05-27 ENCOUNTER — APPOINTMENT (OUTPATIENT)
Dept: ANTEPARTUM | Facility: CLINIC | Age: 30
End: 2025-05-27

## 2025-05-28 LAB — CYTOLOGY CVX/VAG DOC THIN PREP: ABNORMAL

## 2025-06-02 ENCOUNTER — LABORATORY RESULT (OUTPATIENT)
Age: 30
End: 2025-06-02

## 2025-06-02 ENCOUNTER — ASOB RESULT (OUTPATIENT)
Age: 30
End: 2025-06-02

## 2025-06-02 ENCOUNTER — APPOINTMENT (OUTPATIENT)
Dept: ANTEPARTUM | Facility: CLINIC | Age: 30
End: 2025-06-02
Payer: MEDICAID

## 2025-06-02 PROCEDURE — 76814 OB US NUCHAL MEAS ADD-ON: CPT | Mod: 59

## 2025-06-02 PROCEDURE — 76802 OB US < 14 WKS ADDL FETUS: CPT | Mod: 59

## 2025-06-02 PROCEDURE — 76813 OB US NUCHAL MEAS 1 GEST: CPT

## 2025-06-02 PROCEDURE — 76801 OB US < 14 WKS SINGLE FETUS: CPT

## 2025-06-16 ENCOUNTER — LABORATORY RESULT (OUTPATIENT)
Age: 30
End: 2025-06-16

## 2025-06-16 ENCOUNTER — APPOINTMENT (OUTPATIENT)
Dept: ANTEPARTUM | Facility: CLINIC | Age: 30
End: 2025-06-16
Payer: MEDICAID

## 2025-06-16 ENCOUNTER — ASOB RESULT (OUTPATIENT)
Age: 30
End: 2025-06-16

## 2025-06-16 PROCEDURE — 76820 UMBILICAL ARTERY ECHO: CPT

## 2025-06-16 PROCEDURE — 76810 OB US >/= 14 WKS ADDL FETUS: CPT | Mod: 59

## 2025-06-16 PROCEDURE — 76817 TRANSVAGINAL US OBSTETRIC: CPT

## 2025-06-16 PROCEDURE — 76805 OB US >/= 14 WKS SNGL FETUS: CPT

## 2025-06-16 PROCEDURE — 76821 MIDDLE CEREBRAL ARTERY ECHO: CPT

## 2025-06-18 ENCOUNTER — APPOINTMENT (OUTPATIENT)
Facility: CLINIC | Age: 30
End: 2025-06-18

## 2025-06-23 ENCOUNTER — APPOINTMENT (OUTPATIENT)
Facility: CLINIC | Age: 30
End: 2025-06-23
Payer: MEDICAID

## 2025-06-30 ENCOUNTER — APPOINTMENT (OUTPATIENT)
Dept: ANTEPARTUM | Facility: CLINIC | Age: 30
End: 2025-06-30

## 2025-06-30 ENCOUNTER — ASOB RESULT (OUTPATIENT)
Age: 30
End: 2025-06-30

## 2025-06-30 ENCOUNTER — APPOINTMENT (OUTPATIENT)
Facility: CLINIC | Age: 30
End: 2025-06-30
Payer: MEDICAID

## 2025-06-30 PROBLEM — O30.039 MONOCHORIONIC DIAMNIOTIC TWIN PREGNANCY, ANTEPARTUM: Status: ACTIVE | Noted: 2025-06-30

## 2025-06-30 PROCEDURE — 0502F SUBSEQUENT PRENATAL CARE: CPT

## 2025-06-30 PROCEDURE — 76821 MIDDLE CEREBRAL ARTERY ECHO: CPT

## 2025-06-30 PROCEDURE — 76820 UMBILICAL ARTERY ECHO: CPT | Mod: 59

## 2025-06-30 PROCEDURE — 76815 OB US LIMITED FETUS(S): CPT | Mod: 59

## 2025-07-01 LAB
BV BACTERIA RRNA VAG QL NAA+PROBE: NOT DETECTED
C GLABRATA RNA VAG QL NAA+PROBE: NOT DETECTED
C TRACH RRNA SPEC QL NAA+PROBE: NOT DETECTED
CANDIDA RRNA VAG QL PROBE: DETECTED
N GONORRHOEA RRNA SPEC QL NAA+PROBE: NOT DETECTED
SOURCE AMPLIFICATION: NORMAL
T VAGINALIS RRNA SPEC QL NAA+PROBE: NOT DETECTED

## 2025-07-01 RX ORDER — TERCONAZOLE 8 MG/G
0.8 CREAM VAGINAL
Qty: 1 | Refills: 0 | Status: ACTIVE | COMMUNITY
Start: 2025-07-01 | End: 1900-01-01

## 2025-07-14 ENCOUNTER — APPOINTMENT (OUTPATIENT)
Dept: PEDIATRIC CARDIOLOGY | Facility: CLINIC | Age: 30
End: 2025-07-14

## 2025-07-14 ENCOUNTER — APPOINTMENT (OUTPATIENT)
Dept: ANTEPARTUM | Facility: CLINIC | Age: 30
End: 2025-07-14
Payer: MEDICAID

## 2025-07-14 ENCOUNTER — ASOB RESULT (OUTPATIENT)
Age: 30
End: 2025-07-14

## 2025-07-14 PROCEDURE — 76825 ECHO EXAM OF FETAL HEART: CPT | Mod: 59

## 2025-07-14 PROCEDURE — 93325 DOPPLER ECHO COLOR FLOW MAPG: CPT

## 2025-07-14 PROCEDURE — 76812 OB US DETAILED ADDL FETUS: CPT

## 2025-07-14 PROCEDURE — 76821 MIDDLE CEREBRAL ARTERY ECHO: CPT | Mod: 59

## 2025-07-14 PROCEDURE — 76820 UMBILICAL ARTERY ECHO: CPT

## 2025-07-14 PROCEDURE — 76817 TRANSVAGINAL US OBSTETRIC: CPT

## 2025-07-14 PROCEDURE — 99203 OFFICE O/P NEW LOW 30 MIN: CPT

## 2025-07-14 PROCEDURE — 76825 ECHO EXAM OF FETAL HEART: CPT

## 2025-07-14 PROCEDURE — 76811 OB US DETAILED SNGL FETUS: CPT

## 2025-07-14 PROCEDURE — 76827 ECHO EXAM OF FETAL HEART: CPT

## 2025-07-21 ENCOUNTER — NON-APPOINTMENT (OUTPATIENT)
Age: 30
End: 2025-07-21

## 2025-07-21 ENCOUNTER — APPOINTMENT (OUTPATIENT)
Facility: CLINIC | Age: 30
End: 2025-07-21

## 2025-07-23 ENCOUNTER — ASOB RESULT (OUTPATIENT)
Age: 30
End: 2025-07-23

## 2025-07-23 ENCOUNTER — APPOINTMENT (OUTPATIENT)
Facility: CLINIC | Age: 30
End: 2025-07-23

## 2025-07-23 PROCEDURE — 76815 OB US LIMITED FETUS(S): CPT

## 2025-07-23 PROCEDURE — 0502F SUBSEQUENT PRENATAL CARE: CPT

## 2025-07-25 LAB
C TRACH RRNA SPEC QL NAA+PROBE: NOT DETECTED
N GONORRHOEA RRNA SPEC QL NAA+PROBE: NOT DETECTED
SOURCE AMPLIFICATION: NORMAL

## 2025-07-28 ENCOUNTER — APPOINTMENT (OUTPATIENT)
Dept: ANTEPARTUM | Facility: CLINIC | Age: 30
End: 2025-07-28
Payer: COMMERCIAL

## 2025-07-28 ENCOUNTER — ASOB RESULT (OUTPATIENT)
Age: 30
End: 2025-07-28

## 2025-07-28 PROCEDURE — 76821 MIDDLE CEREBRAL ARTERY ECHO: CPT

## 2025-07-28 PROCEDURE — 76820 UMBILICAL ARTERY ECHO: CPT

## 2025-07-28 PROCEDURE — 99214 OFFICE O/P EST MOD 30 MIN: CPT | Mod: 25

## 2025-07-28 PROCEDURE — 76815 OB US LIMITED FETUS(S): CPT | Mod: 59

## 2025-07-30 ENCOUNTER — APPOINTMENT (OUTPATIENT)
Dept: ANTEPARTUM | Facility: CLINIC | Age: 30
End: 2025-07-30
Payer: COMMERCIAL

## 2025-07-30 ENCOUNTER — ASOB RESULT (OUTPATIENT)
Age: 30
End: 2025-07-30

## 2025-07-30 PROCEDURE — 76821 MIDDLE CEREBRAL ARTERY ECHO: CPT | Mod: 59

## 2025-07-30 PROCEDURE — 76815 OB US LIMITED FETUS(S): CPT | Mod: 59

## 2025-07-30 PROCEDURE — 76821 MIDDLE CEREBRAL ARTERY ECHO: CPT

## 2025-07-30 PROCEDURE — 76820 UMBILICAL ARTERY ECHO: CPT

## 2025-07-30 PROCEDURE — 76820 UMBILICAL ARTERY ECHO: CPT | Mod: 59

## 2025-08-11 ENCOUNTER — APPOINTMENT (OUTPATIENT)
Dept: ANTEPARTUM | Facility: CLINIC | Age: 30
End: 2025-08-11
Payer: COMMERCIAL

## 2025-08-11 ENCOUNTER — APPOINTMENT (OUTPATIENT)
Facility: CLINIC | Age: 30
End: 2025-08-11
Payer: COMMERCIAL

## 2025-08-11 ENCOUNTER — APPOINTMENT (OUTPATIENT)
Dept: ANTEPARTUM | Facility: CLINIC | Age: 30
End: 2025-08-11

## 2025-08-11 ENCOUNTER — NON-APPOINTMENT (OUTPATIENT)
Age: 30
End: 2025-08-11

## 2025-08-11 ENCOUNTER — ASOB RESULT (OUTPATIENT)
Age: 30
End: 2025-08-11

## 2025-08-11 PROCEDURE — 0502F SUBSEQUENT PRENATAL CARE: CPT

## 2025-08-11 PROCEDURE — 99214 OFFICE O/P EST MOD 30 MIN: CPT | Mod: 25

## 2025-08-11 PROCEDURE — 76821 MIDDLE CEREBRAL ARTERY ECHO: CPT

## 2025-08-11 PROCEDURE — 76820 UMBILICAL ARTERY ECHO: CPT

## 2025-08-11 PROCEDURE — 76815 OB US LIMITED FETUS(S): CPT | Mod: 59

## 2025-08-25 ENCOUNTER — APPOINTMENT (OUTPATIENT)
Dept: ANTEPARTUM | Facility: CLINIC | Age: 30
End: 2025-08-25
Payer: COMMERCIAL

## 2025-08-25 ENCOUNTER — ASOB RESULT (OUTPATIENT)
Age: 30
End: 2025-08-25

## 2025-08-25 PROCEDURE — 76820 UMBILICAL ARTERY ECHO: CPT | Mod: 59

## 2025-08-25 PROCEDURE — 76816 OB US FOLLOW-UP PER FETUS: CPT

## 2025-08-25 PROCEDURE — 99214 OFFICE O/P EST MOD 30 MIN: CPT | Mod: 25

## 2025-09-02 ENCOUNTER — APPOINTMENT (OUTPATIENT)
Dept: ANTEPARTUM | Facility: CLINIC | Age: 30
End: 2025-09-02
Payer: COMMERCIAL

## 2025-09-02 ENCOUNTER — ASOB RESULT (OUTPATIENT)
Age: 30
End: 2025-09-02

## 2025-09-02 PROCEDURE — 76818 FETAL BIOPHYS PROFILE W/NST: CPT | Mod: 59

## 2025-09-02 PROCEDURE — 76820 UMBILICAL ARTERY ECHO: CPT

## 2025-09-02 PROCEDURE — 76821 MIDDLE CEREBRAL ARTERY ECHO: CPT | Mod: 59

## 2025-09-03 ENCOUNTER — APPOINTMENT (OUTPATIENT)
Facility: CLINIC | Age: 30
End: 2025-09-03
Payer: COMMERCIAL

## 2025-09-03 PROCEDURE — 0502F SUBSEQUENT PRENATAL CARE: CPT

## 2025-09-08 ENCOUNTER — APPOINTMENT (OUTPATIENT)
Dept: ANTEPARTUM | Facility: CLINIC | Age: 30
End: 2025-09-08
Payer: COMMERCIAL

## 2025-09-08 ENCOUNTER — ASOB RESULT (OUTPATIENT)
Age: 30
End: 2025-09-08

## 2025-09-08 PROCEDURE — 76820 UMBILICAL ARTERY ECHO: CPT | Mod: 59

## 2025-09-08 PROCEDURE — 76819 FETAL BIOPHYS PROFIL W/O NST: CPT | Mod: 59

## 2025-09-08 PROCEDURE — 76821 MIDDLE CEREBRAL ARTERY ECHO: CPT

## 2025-09-11 ENCOUNTER — ASOB RESULT (OUTPATIENT)
Age: 30
End: 2025-09-11

## 2025-09-11 ENCOUNTER — APPOINTMENT (OUTPATIENT)
Dept: ANTEPARTUM | Facility: CLINIC | Age: 30
End: 2025-09-11

## 2025-09-11 PROCEDURE — 76821 MIDDLE CEREBRAL ARTERY ECHO: CPT | Mod: 59

## 2025-09-11 PROCEDURE — 76818 FETAL BIOPHYS PROFILE W/NST: CPT | Mod: 59

## 2025-09-11 PROCEDURE — 76820 UMBILICAL ARTERY ECHO: CPT | Mod: 59

## 2025-09-11 PROCEDURE — 99213 OFFICE O/P EST LOW 20 MIN: CPT | Mod: 25

## 2025-09-12 ENCOUNTER — APPOINTMENT (OUTPATIENT)
Dept: MRI IMAGING | Facility: HOSPITAL | Age: 30
End: 2025-09-12

## 2025-09-12 PROCEDURE — 74712 MRI FETAL SNGL/1ST GESTATION: CPT | Mod: 26

## 2025-09-12 PROCEDURE — 74713 MRI FETAL EA ADDL GESTATION: CPT | Mod: 26

## 2025-09-15 ENCOUNTER — ASOB RESULT (OUTPATIENT)
Age: 30
End: 2025-09-15

## 2025-09-15 ENCOUNTER — APPOINTMENT (OUTPATIENT)
Facility: CLINIC | Age: 30
End: 2025-09-15
Payer: COMMERCIAL

## 2025-09-15 ENCOUNTER — APPOINTMENT (OUTPATIENT)
Dept: ANTEPARTUM | Facility: CLINIC | Age: 30
End: 2025-09-15
Payer: COMMERCIAL

## 2025-09-15 DIAGNOSIS — Z23 ENCOUNTER FOR IMMUNIZATION: ICD-10-CM

## 2025-09-15 PROCEDURE — 90715 TDAP VACCINE 7 YRS/> IM: CPT

## 2025-09-15 PROCEDURE — 76821 MIDDLE CEREBRAL ARTERY ECHO: CPT | Mod: 59

## 2025-09-15 PROCEDURE — 0502F SUBSEQUENT PRENATAL CARE: CPT

## 2025-09-15 PROCEDURE — 90471 IMMUNIZATION ADMIN: CPT

## 2025-09-15 PROCEDURE — 76820 UMBILICAL ARTERY ECHO: CPT | Mod: 59

## 2025-09-15 PROCEDURE — 76818 FETAL BIOPHYS PROFILE W/NST: CPT

## 2025-09-15 PROCEDURE — 76816 OB US FOLLOW-UP PER FETUS: CPT | Mod: 59

## 2025-09-18 ENCOUNTER — APPOINTMENT (OUTPATIENT)
Dept: ANTEPARTUM | Facility: CLINIC | Age: 30
End: 2025-09-18